# Patient Record
Sex: FEMALE | Race: WHITE | NOT HISPANIC OR LATINO | Employment: FULL TIME | URBAN - METROPOLITAN AREA
[De-identification: names, ages, dates, MRNs, and addresses within clinical notes are randomized per-mention and may not be internally consistent; named-entity substitution may affect disease eponyms.]

---

## 2017-12-12 ENCOUNTER — DOCUMENTATION (OUTPATIENT)
Dept: NEUROSURGERY | Facility: CLINIC | Age: 52
End: 2017-12-12

## 2017-12-12 NOTE — PROGRESS NOTES
Pt called wanting to know if it was safe to have an MRI since her procedure with  on 10/20/2015, I adv her that it was safe.  Karen Murcia CMA 12:15PM

## 2018-03-05 ENCOUNTER — HOSPITAL ENCOUNTER (OUTPATIENT)
Dept: CARDIOLOGY | Facility: HOSPITAL | Age: 53
Discharge: HOME | End: 2018-03-05
Attending: PLASTIC SURGERY | Admitting: PLASTIC SURGERY

## 2018-03-05 ENCOUNTER — TRANSCRIBE ORDERS (OUTPATIENT)
Dept: ADMINISTRATIVE | Facility: HOSPITAL | Age: 53
End: 2018-03-05

## 2018-03-05 DIAGNOSIS — D48.5 NEOPLASM OF UNCERTAIN BEHAVIOR OF SKIN: ICD-10-CM

## 2018-03-05 DIAGNOSIS — Z01.818 PRE-OP EXAM: Primary | ICD-10-CM

## 2018-03-05 DIAGNOSIS — Z01.818 PRE-OP EXAM: ICD-10-CM

## 2018-03-05 PROCEDURE — 93005 ELECTROCARDIOGRAM TRACING: CPT | Performed by: PLASTIC SURGERY

## 2018-03-05 PROCEDURE — 93010 ELECTROCARDIOGRAM REPORT: CPT | Performed by: INTERNAL MEDICINE

## 2018-03-14 PROCEDURE — 88304 TISSUE EXAM BY PATHOLOGIST: CPT | Performed by: PLASTIC SURGERY

## 2018-03-15 ENCOUNTER — LAB REQUISITION (OUTPATIENT)
Dept: LAB | Facility: HOSPITAL | Age: 53
End: 2018-03-15

## 2018-03-15 DIAGNOSIS — Z00.00 ENCOUNTER FOR GENERAL ADULT MEDICAL EXAMINATION WITHOUT ABNORMAL FINDINGS: ICD-10-CM

## 2018-03-16 LAB
CYTO UR: NORMAL
LAB AP CASE REPORT: NORMAL
LAB AP CLINICAL INFORMATION: NORMAL
Lab: NORMAL
PATH REPORT.FINAL DX SPEC: NORMAL
PATH REPORT.GROSS SPEC: NORMAL

## 2018-06-21 ENCOUNTER — HOSPITAL ENCOUNTER (OUTPATIENT)
Dept: GENERAL RADIOLOGY | Facility: HOSPITAL | Age: 53
Discharge: HOME | End: 2018-06-21
Attending: FAMILY MEDICINE | Admitting: FAMILY MEDICINE

## 2019-06-07 ENCOUNTER — OFFICE VISIT (OUTPATIENT)
Dept: NEUROLOGY | Facility: CLINIC | Age: 54
End: 2019-06-07

## 2019-06-07 VITALS
HEART RATE: 92 BPM | BODY MASS INDEX: 21.48 KG/M2 | HEIGHT: 69 IN | DIASTOLIC BLOOD PRESSURE: 70 MMHG | WEIGHT: 145 LBS | SYSTOLIC BLOOD PRESSURE: 115 MMHG | OXYGEN SATURATION: 99 %

## 2019-06-07 DIAGNOSIS — G25.81 RESTLESS LEGS SYNDROME (RLS): ICD-10-CM

## 2019-06-07 DIAGNOSIS — R55 SYNCOPE, CARDIOGENIC: Primary | ICD-10-CM

## 2019-06-07 DIAGNOSIS — I77.0 A-V FISTULA (HCC): ICD-10-CM

## 2019-06-07 DIAGNOSIS — G43.909 MIGRAINE WITHOUT STATUS MIGRAINOSUS, NOT INTRACTABLE, UNSPECIFIED MIGRAINE TYPE: ICD-10-CM

## 2019-06-07 DIAGNOSIS — R42 DIZZINESS: ICD-10-CM

## 2019-06-07 PROCEDURE — 99244 OFF/OP CNSLTJ NEW/EST MOD 40: CPT | Performed by: PSYCHIATRY & NEUROLOGY

## 2019-06-07 RX ORDER — DEXMETHYLPHENIDATE HYDROCHLORIDE 10 MG/1
10 TABLET ORAL DAILY PRN
COMMUNITY
End: 2019-12-17 | Stop reason: SDUPTHER

## 2019-06-07 NOTE — PROGRESS NOTES
Subjective:     Patient ID: Joanie Higuera is a 53 y.o. female.    History of Present Illness    The patient is a 53-year-old right-handed woman who was referred to our office for evaluation of dizziness.  The patient was seen today in consultation per the request of Dr. Bragg.  Patient also has chief complaints of:  Tinnitus  Near-syncope  Shortness of breath  Ataxia  Hip pain on the right  Numbness in the right foot and thigh  Decreased   Foggy thoughts  Shoulder pain  Dyslexic writing  Visual obscurations  Patient states she is she has had problems for about a year but it appears to be more than this she has an AV malformation in her heart that she states was operated on with coiling by a neurosurgeon done in [facility].  She is seen several cardiologist as well and has been diagnosed in the past as having cardiogenic syncope.  She has had a lipoma removed by a hand surgeon and states that she may be having neck pain related to this.  She has not talked to the surgeon about this.  She currently sees a cardiologist in [facility].  She has not had any recent neurodiagnostic studies.  She has had a pacemaker in the past but this was removed secondary to a staph infection.  This was apparently done at [facility].  Her Korlym procedure was done in [place].  Some of the records are in the chart and others or not.  I asked whether she could have an MRI and she states that she was told that she could but has records concerning this.  The patient is also on Topamax, Wellbutrin, and Focalin.      The following portions of the patient's history were reviewed and updated as appropriate: allergies, current medications, past family history, past medical history, past social history, past surgical history and problem list.      Family History   Problem Relation Age of Onset   • Arrhythmia Mother    • Cancer Mother    • Stroke Father    • Diabetes Father    • Parkinsonism Father    • Seizures Father         Past Medical History:   Diagnosis Date   • A-V fistula (CMS/HCC)    • Arthritis    • Bursitis of hip     LEFT   • Depression    • Hypertension    • Ischemic colitis (CMS/HCC)     HX   • Migraines    • MRSA infection 2011    RESULTING IN PPM EXPLANT   • PONV (postoperative nausea and vomiting)    • Restless leg syndrome    • Spinal headache    • Syncope, cardiogenic 1991    PM INSERTED   • Thyroid nodule     HX SURGERY       Social History     Socioeconomic History   • Marital status: Single     Spouse name: Not on file   • Number of children: Not on file   • Years of education: Not on file   • Highest education level: Not on file   Tobacco Use   • Smoking status: Never Smoker   • Smokeless tobacco: Never Used   Substance and Sexual Activity   • Alcohol use: No   • Drug use: No   • Sexual activity: Defer         Current Outpatient Medications:   •  buPROPion XL (WELLBUTRIN XL) 300 MG 24 hr tablet, Take 300 mg by mouth every night., Disp: , Rfl:   •  cetirizine (ZyrTEC) 10 MG tablet, Take 10 mg by mouth at night as needed for allergies., Disp: , Rfl:   •  dexmethylphenidate (FOCALIN) 10 MG tablet, Take 10 mg by mouth Daily As Needed., Disp: , Rfl:   •  Magnesium Oxide 400 MG capsule, Take 400 mg by mouth every night., Disp: , Rfl:   •  meloxicam (MOBIC) 7.5 MG tablet, Take 7.5 mg by mouth every night., Disp: , Rfl:   •  Multiple Vitamins-Minerals (MULTIVITAMIN ADULT PO), Take 1 tablet by mouth every morning., Disp: , Rfl:   •  pramipexole (MIRAPEX) 0.125 MG tablet, Take 0.25 mg by mouth every night., Disp: , Rfl:   •  topiramate (TOPAMAX) 50 MG tablet, Take 50 mg by mouth every night., Disp: , Rfl:     Review of Systems   Constitutional: Positive for fatigue. Negative for activity change, appetite change, chills, diaphoresis, fever and unexpected weight change.   HENT: Negative for congestion, dental problem, drooling, ear discharge, ear pain, facial swelling, hearing loss, mouth sores, nosebleeds, postnasal  drip, rhinorrhea, sinus pressure, sinus pain, sneezing, sore throat, tinnitus, trouble swallowing and voice change.    Eyes: Positive for photophobia and visual disturbance. Negative for pain, discharge, redness and itching.   Respiratory: Positive for shortness of breath. Negative for apnea, cough, choking, chest tightness, wheezing and stridor.    Cardiovascular: Positive for palpitations. Negative for chest pain and leg swelling.   Gastrointestinal: Negative.    Endocrine: Negative.    Musculoskeletal: Positive for arthralgias, gait problem, neck pain and neck stiffness. Negative for back pain, joint swelling and myalgias.   Skin: Negative.    Allergic/Immunologic: Negative.    Neurological: Positive for dizziness, weakness, light-headedness, numbness and headaches. Negative for tremors, seizures, syncope, facial asymmetry and speech difficulty.   Hematological: Negative.    Psychiatric/Behavioral: Positive for confusion. Negative for agitation, behavioral problems, decreased concentration, dysphoric mood, hallucinations, self-injury, sleep disturbance and suicidal ideas. The patient is not nervous/anxious and is not hyperactive.         I have reviewed ROS completed by medical assistant.     Objective:    Neurologic Exam  General appearance is normal. Mental status showed normal orientation, memory, attention span and concentration, language, and fund of knowledge for age.    Cranial nerve exam- visual fields to OKN tape, funduscopy with examination of the optic disc and posterior segments of the eye, eye movements, pupillary reflexes, muscles of mastication, facial musculature, hearing, palatal movement, shoulder shrug and tongue protrusion were all normal.    Sensory examination was normal.  Deep tendon reflexes were 2+ and symmetric.    No pathologic reflexes were noted.  Cerebellar function was normal.  No focal weakness was noted.  No drift of outstretched arms.  Tone was normal.  Gait and station were  normal.  No edema was noted.      Physical Exam    Assessment/Plan:     Joanie was seen today for dizziness.    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  She has a primary problem she was referred for for dizziness.  However she had a about another does not chief complaints.  It is difficult to swim through the complaints and make a connecting neurological diagnosis.  A lot of her symptoms seem to be related to syncope for which she has had a multitude of opinions and evaluation previously.  Exclude neurological issues I feel an MRI of the brain would be the next step.  I need to have assurance that this can be done in view of her past procedures.  I f asked that she get a note from her neurosurgeon concerning this.  For her neurologic diagnosis is unclear.  She will call me after she gets the note okaying the MRI from her neurosurgeon.  We would plan to set up an MRI at that point.  I have not set up specific follow-up in the office at this point.  Thank you for allowing me to share in the care of this patient.  Bairon Hernandez M.D.

## 2019-12-17 ENCOUNTER — TELEPHONE (OUTPATIENT)
Dept: FAMILY MEDICINE CLINIC | Facility: CLINIC | Age: 54
End: 2019-12-17

## 2019-12-17 ENCOUNTER — HOSPITAL ENCOUNTER (OUTPATIENT)
Dept: GENERAL RADIOLOGY | Facility: HOSPITAL | Age: 54
Discharge: HOME | End: 2019-12-17
Admitting: FAMILY MEDICINE

## 2019-12-17 ENCOUNTER — TELEPHONE (OUTPATIENT)
Dept: NEUROSURGERY | Facility: CLINIC | Age: 54
End: 2019-12-17

## 2019-12-17 ENCOUNTER — OFFICE VISIT (OUTPATIENT)
Dept: FAMILY MEDICINE CLINIC | Facility: CLINIC | Age: 54
End: 2019-12-17

## 2019-12-17 VITALS
DIASTOLIC BLOOD PRESSURE: 60 MMHG | WEIGHT: 164.2 LBS | RESPIRATION RATE: 16 BRPM | HEART RATE: 115 BPM | HEIGHT: 69 IN | TEMPERATURE: 97.7 F | SYSTOLIC BLOOD PRESSURE: 102 MMHG | OXYGEN SATURATION: 93 % | BODY MASS INDEX: 24.32 KG/M2

## 2019-12-17 DIAGNOSIS — G43.909 MIGRAINE WITHOUT STATUS MIGRAINOSUS, NOT INTRACTABLE, UNSPECIFIED MIGRAINE TYPE: ICD-10-CM

## 2019-12-17 DIAGNOSIS — I77.0 A-V FISTULA (HCC): ICD-10-CM

## 2019-12-17 DIAGNOSIS — R53.83 MALAISE AND FATIGUE: ICD-10-CM

## 2019-12-17 DIAGNOSIS — R53.81 MALAISE AND FATIGUE: ICD-10-CM

## 2019-12-17 DIAGNOSIS — E55.9 VITAMIN D DEFICIENCY: ICD-10-CM

## 2019-12-17 DIAGNOSIS — E66.3 OVERWEIGHT: ICD-10-CM

## 2019-12-17 DIAGNOSIS — F43.0 ACUTE REACTION TO STRESS: Primary | ICD-10-CM

## 2019-12-17 DIAGNOSIS — R05.9 COUGH: ICD-10-CM

## 2019-12-17 DIAGNOSIS — F43.23 ACUTE ADJUSTMENT DISORDER WITH MIXED ANXIETY AND DEPRESSED MOOD: ICD-10-CM

## 2019-12-17 PROCEDURE — 71046 X-RAY EXAM CHEST 2 VIEWS: CPT

## 2019-12-17 PROCEDURE — 99214 OFFICE O/P EST MOD 30 MIN: CPT | Performed by: FAMILY MEDICINE

## 2019-12-17 RX ORDER — SERTRALINE HYDROCHLORIDE 25 MG/1
25 TABLET, FILM COATED ORAL NIGHTLY
Qty: 30 TABLET | Refills: 5 | Status: SHIPPED | OUTPATIENT
Start: 2019-12-17 | End: 2020-04-27 | Stop reason: SDUPTHER

## 2019-12-17 RX ORDER — MELOXICAM 15 MG/1
TABLET ORAL
Refills: 2 | COMMUNITY
Start: 2019-12-16 | End: 2020-05-04

## 2019-12-17 RX ORDER — DEXMETHYLPHENIDATE HYDROCHLORIDE 10 MG/1
10 TABLET ORAL DAILY
Qty: 30 TABLET | Refills: 0 | Status: SHIPPED | OUTPATIENT
Start: 2019-12-17 | End: 2020-01-20 | Stop reason: SDUPTHER

## 2019-12-17 NOTE — TELEPHONE ENCOUNTER
wanted to have her get an mri of the brain without contrast. He had me call  who put in a vascular coil in the patient. I spoke to kinsey gloria MA and she said that she can have a a mri with this in. I have ordered that and after a pre cert it will be scheduled.

## 2019-12-17 NOTE — TELEPHONE ENCOUNTER
----- Message from Eren Brock MD sent at 12/17/2019  5:43 PM EST -----  Chest x-ray looks good, everything normal, thanks

## 2019-12-17 NOTE — TELEPHONE ENCOUNTER
Provider:  Comfort  Caller: Marianne  Time of call:   2:23  Phone #:  [phone]  Surgery:  10/20/15  Surgery Date:  Stent *  Last visit:   12/0415 radiology  Next visit: None    FABIÁN:         Reason for call:     Marianne called from Dr. Brock's office and wants to know if patient can have a MRI of the brain without contrast.  (Patient had a stent in 2015)

## 2019-12-18 LAB
25(OH)D3+25(OH)D2 SERPL-MCNC: 25.1 NG/ML (ref 30–100)
ALBUMIN SERPL-MCNC: 4.6 G/DL (ref 3.5–5.5)
ALBUMIN/GLOB SERPL: 2.2 {RATIO} (ref 1.2–2.2)
ALP SERPL-CCNC: 63 IU/L (ref 39–117)
ALT SERPL-CCNC: 13 IU/L (ref 0–32)
AST SERPL-CCNC: 16 IU/L (ref 0–40)
BASOPHILS # BLD AUTO: 0 X10E3/UL (ref 0–0.2)
BASOPHILS NFR BLD AUTO: 0 %
BILIRUB SERPL-MCNC: 0.2 MG/DL (ref 0–1.2)
BUN SERPL-MCNC: 20 MG/DL (ref 6–24)
BUN/CREAT SERPL: 18 (ref 9–23)
CALCIUM SERPL-MCNC: 10.3 MG/DL (ref 8.7–10.2)
CHLORIDE SERPL-SCNC: 105 MMOL/L (ref 96–106)
CO2 SERPL-SCNC: 25 MMOL/L (ref 20–29)
CREAT SERPL-MCNC: 1.1 MG/DL (ref 0.57–1)
EBV EA IGG SER-ACNC: 32.5 U/ML (ref 0–8.9)
EBV NA IGG SER IA-ACNC: 461 U/ML (ref 0–17.9)
EBV VCA IGG SER IA-ACNC: 111 U/ML (ref 0–17.9)
EBV VCA IGM SER IA-ACNC: <36 U/ML (ref 0–35.9)
EOSINOPHIL # BLD AUTO: 0.1 X10E3/UL (ref 0–0.4)
EOSINOPHIL NFR BLD AUTO: 1 %
ERYTHROCYTE [DISTWIDTH] IN BLOOD BY AUTOMATED COUNT: 13.7 % (ref 12.3–15.4)
FOLATE SERPL-MCNC: 12.2 NG/ML
GLOBULIN SER CALC-MCNC: 2.1 G/DL (ref 1.5–4.5)
GLUCOSE SERPL-MCNC: 90 MG/DL (ref 65–99)
HCT VFR BLD AUTO: 40.5 % (ref 34–46.6)
HGB BLD-MCNC: 13.4 G/DL (ref 11.1–15.9)
IMM GRANULOCYTES # BLD AUTO: 0 X10E3/UL (ref 0–0.1)
IMM GRANULOCYTES NFR BLD AUTO: 0 %
LYMPHOCYTES # BLD AUTO: 1.6 X10E3/UL (ref 0.7–3.1)
LYMPHOCYTES NFR BLD AUTO: 33 %
MCH RBC QN AUTO: 29.1 PG (ref 26.6–33)
MCHC RBC AUTO-ENTMCNC: 33.1 G/DL (ref 31.5–35.7)
MCV RBC AUTO: 88 FL (ref 79–97)
MONOCYTES # BLD AUTO: 0.4 X10E3/UL (ref 0.1–0.9)
MONOCYTES NFR BLD AUTO: 7 %
NEUTROPHILS # BLD AUTO: 2.8 X10E3/UL (ref 1.4–7)
NEUTROPHILS NFR BLD AUTO: 59 %
PLATELET # BLD AUTO: 339 X10E3/UL (ref 150–450)
POTASSIUM SERPL-SCNC: 5 MMOL/L (ref 3.5–5.2)
PROT SERPL-MCNC: 6.7 G/DL (ref 6–8.5)
RBC # BLD AUTO: 4.6 X10E6/UL (ref 3.77–5.28)
SERVICE CMNT-IMP: ABNORMAL
SODIUM SERPL-SCNC: 143 MMOL/L (ref 134–144)
T4 FREE SERPL-MCNC: 1.18 NG/DL (ref 0.82–1.77)
TSH SERPL DL<=0.005 MIU/L-ACNC: 1.53 UIU/ML (ref 0.45–4.5)
VIT B12 SERPL-MCNC: 391 PG/ML (ref 232–1245)
WBC # BLD AUTO: 4.9 X10E3/UL (ref 3.4–10.8)

## 2019-12-26 DIAGNOSIS — G43.909 MIGRAINE WITHOUT STATUS MIGRAINOSUS, NOT INTRACTABLE, UNSPECIFIED MIGRAINE TYPE: ICD-10-CM

## 2019-12-26 NOTE — PROGRESS NOTES
Your MRI of your brain looks good, everything normal, it did show some chronic inflammation/sinusitis in your sinuses.

## 2019-12-27 ENCOUNTER — TELEPHONE (OUTPATIENT)
Dept: FAMILY MEDICINE CLINIC | Facility: CLINIC | Age: 54
End: 2019-12-27

## 2019-12-27 RX ORDER — ERGOCALCIFEROL 1.25 MG/1
50000 CAPSULE ORAL WEEKLY
Qty: 6 CAPSULE | Refills: 0 | Status: SHIPPED | OUTPATIENT
Start: 2019-12-27 | End: 2021-12-22 | Stop reason: SDUPTHER

## 2019-12-27 NOTE — TELEPHONE ENCOUNTER
----- Message from Eren Brock MD sent at 12/26/2019 12:22 PM EST -----  Your blood work overall looks good, blood count and thyroid are normal, your kidney function is just mildly low, this could be just from some dehydration, will recheck this again in the future, your vitamin D levels are low, this could cause fatigue, I will send a high-dose vitamin D tablet for you to take weekly for the next 6 weeks, after that you should take 2000 units of vitamin D over-the-counter daily, your blood work shows past exposure to mono but no recent infection, thanks    Send me a request for high-dose vitamin D3 50,000 units weekly for 6 weeks, thanks

## 2020-01-13 ENCOUNTER — TELEPHONE (OUTPATIENT)
Dept: FAMILY MEDICINE CLINIC | Facility: CLINIC | Age: 55
End: 2020-01-13

## 2020-01-13 DIAGNOSIS — M54.2 NECK PAIN: Primary | ICD-10-CM

## 2020-01-13 NOTE — TELEPHONE ENCOUNTER
Go ahead and get her set up for an x-ray of her C-spine, would recommend HEENT referral to evaluate the tinnitus could see Dr. Souza, the the emphysematous contour of her lungs may have just been if she was inhaling when the picture was taken and her lungs were expanded, lets go ahead and set her up for some pulmonary function tests to evaluate her lung function, thanks

## 2020-01-13 NOTE — TELEPHONE ENCOUNTER
----- Message from Joanie Higuera sent at 1/10/2020  4:10 PM EST -----  Regarding: RE:in response to numbness  Contact: [phone]  Ok.   Along with the numbness, he and I discussed ringing in the ears that has been going on for close to 2 years, and if the numbness and ringing could be related to something getting pinched or compressed. Hoping some imaging could shed some light on it.   Also, I had questions that I sent regarding the chest X-ray, what does emphysematous contouring mean?  Thank you!  ----- Message -----  From: ENDER HUNTER  Sent: 1/10/2020  1:57 PM EST  To: Joanie Higuera  Subject: in response to numbness   said to Let you know that your MRI of your brain does rule out a lot of problems,  would be reasonable to consider some imaging of your neck to look into the numbness you are having further, if you are open to that he said we can do an x-ray of your neck first, we will call you with the results and then move onto the MRI of your neck if necessary. So let me know if you want that xray!  Thank you  Marianne galaviz

## 2020-01-16 DIAGNOSIS — F43.0 ACUTE REACTION TO STRESS: Primary | ICD-10-CM

## 2020-01-17 RX ORDER — LORAZEPAM 0.5 MG/1
TABLET ORAL
Qty: 15 TABLET | Refills: 0 | Status: SHIPPED | OUTPATIENT
Start: 2020-01-17 | End: 2020-02-18

## 2020-01-20 DIAGNOSIS — F43.23 ACUTE ADJUSTMENT DISORDER WITH MIXED ANXIETY AND DEPRESSED MOOD: ICD-10-CM

## 2020-01-20 RX ORDER — DEXMETHYLPHENIDATE HYDROCHLORIDE 10 MG/1
10 TABLET ORAL DAILY
Qty: 30 TABLET | Refills: 0 | Status: SHIPPED | OUTPATIENT
Start: 2020-01-20 | End: 2020-02-12 | Stop reason: SDUPTHER

## 2020-02-12 DIAGNOSIS — F43.0 ACUTE REACTION TO STRESS: ICD-10-CM

## 2020-02-12 DIAGNOSIS — F43.23 ACUTE ADJUSTMENT DISORDER WITH MIXED ANXIETY AND DEPRESSED MOOD: ICD-10-CM

## 2020-02-19 RX ORDER — LORAZEPAM 0.5 MG/1
TABLET ORAL
Qty: 15 TABLET | Refills: 1 | Status: SHIPPED | OUTPATIENT
Start: 2020-02-19 | End: 2020-04-27 | Stop reason: SDUPTHER

## 2020-02-19 RX ORDER — DEXMETHYLPHENIDATE HYDROCHLORIDE 10 MG/1
10 TABLET ORAL DAILY
Qty: 30 TABLET | Refills: 0 | Status: SHIPPED | OUTPATIENT
Start: 2020-02-19 | End: 2020-04-27 | Stop reason: SDUPTHER

## 2020-03-23 ENCOUNTER — E-VISIT (OUTPATIENT)
Dept: FAMILY MEDICINE CLINIC | Facility: TELEHEALTH | Age: 55
End: 2020-03-23

## 2020-03-24 NOTE — PROGRESS NOTES
I counseled the patient to follow up with UC     With fever, cough and soa as well as comorbid conditions, recommend screening at Urgent Care.  .

## 2020-04-06 ENCOUNTER — OFFICE VISIT (OUTPATIENT)
Dept: FAMILY MEDICINE CLINIC | Facility: CLINIC | Age: 55
End: 2020-04-06

## 2020-04-06 VITALS
HEIGHT: 69 IN | HEART RATE: 101 BPM | OXYGEN SATURATION: 100 % | SYSTOLIC BLOOD PRESSURE: 110 MMHG | RESPIRATION RATE: 16 BRPM | DIASTOLIC BLOOD PRESSURE: 62 MMHG | WEIGHT: 159.6 LBS | BODY MASS INDEX: 23.64 KG/M2 | TEMPERATURE: 98 F

## 2020-04-06 DIAGNOSIS — F43.0 ACUTE REACTION TO STRESS: ICD-10-CM

## 2020-04-06 DIAGNOSIS — M54.2 NECK PAIN: ICD-10-CM

## 2020-04-06 DIAGNOSIS — E66.3 OVERWEIGHT: ICD-10-CM

## 2020-04-06 DIAGNOSIS — I95.9 HYPOTENSION, UNSPECIFIED HYPOTENSION TYPE: ICD-10-CM

## 2020-04-06 DIAGNOSIS — G43.909 MIGRAINE WITHOUT STATUS MIGRAINOSUS, NOT INTRACTABLE, UNSPECIFIED MIGRAINE TYPE: Primary | ICD-10-CM

## 2020-04-06 DIAGNOSIS — I77.0 A-V FISTULA (HCC): ICD-10-CM

## 2020-04-06 DIAGNOSIS — G47.26 SHIFT WORK SLEEP DISORDER: ICD-10-CM

## 2020-04-06 PROCEDURE — 99214 OFFICE O/P EST MOD 30 MIN: CPT | Performed by: FAMILY MEDICINE

## 2020-04-06 RX ORDER — AMOXICILLIN 500 MG/1
CAPSULE ORAL
COMMUNITY
Start: 2020-03-10 | End: 2020-04-06

## 2020-04-06 NOTE — PROGRESS NOTES
Subjective   Joanie Higuera is a 54 y.o. female.     Chief Complaint   Patient presents with   • Migraine   • Cough       Migraine    This is a recurrent problem. The current episode started more than 1 year ago. The problem occurs constantly. The problem has been gradually worsening. The pain is located in the temporal region. The pain does not radiate. The pain quality is not similar to prior headaches. The quality of the pain is described as aching and dull. Associated symptoms include coughing and weakness. Pertinent negatives include no abdominal pain, nausea or seizures.            I personally reviewed and updated the patient's allergies, medications, problem list, and past medical, surgical, social, and family history.     Family History   Problem Relation Age of Onset   • Arrhythmia Mother    • Cancer Mother    • Stroke Father    • Diabetes Father    • Parkinsonism Father    • Seizures Father        Social History     Tobacco Use   • Smoking status: Never Smoker   • Smokeless tobacco: Never Used   Substance Use Topics   • Alcohol use: No   • Drug use: No       Past Surgical History:   Procedure Laterality Date   • ARTERIOVENOUS FISTULA REPAIR  2015    ENDO REPAIR BY DR KAPLAN   • CARDIAC CATHETERIZATION     • CARDIAC CATHETERIZATION  08/05/2016    RIGHT HEART CATH PER DR. 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 fistula (CMS/HCC)   • Bursitis of hip   • PONV (postoperative nausea and vomiting)   • Ischemic colitis (CMS/HCC)   • Syncope, cardiogenic   • MRSA infection   • Thyroid nodule   • Arthritis    • Dizziness   • Migraine without status migrainosus, not intractable   • Restless legs syndrome (RLS)   • Menopause   • Pacemaker infection (CMS/HCC)   • Palpitations   • Acute reaction to stress   • Tachycardia   • Allergic rhinitis   • Asthma   • GERD (gastroesophageal reflux disease)   • Heart murmur   • Hyperlipemia   • Hypotension   • Overweight   • RLS (restless legs syndrome)   • Acute adjustment disorder with mixed anxiety and depressed mood   • Shift work sleep disorder         Current Outpatient Medications:   •  buPROPion XL (WELLBUTRIN XL) 300 MG 24 hr tablet, Take 300 mg by mouth every night., Disp: , Rfl:   •  cetirizine (ZyrTEC) 10 MG tablet, Take 10 mg by mouth at night as needed for allergies., Disp: , Rfl:   •  dexmethylphenidate (FOCALIN) 10 MG tablet, Take 1 tablet by mouth Daily., Disp: 30 tablet, Rfl: 0  •  LORazepam (ATIVAN) 0.5 MG tablet, TAKE 1 TABLET BY MOUTH EVERY 2 TO 4 HOURS AS NEEDED DURING TRAVEL, Disp: 15 tablet, Rfl: 1  •  Magnesium Oxide 400 MG capsule, Take 400 mg by mouth every night., Disp: , Rfl:   •  meloxicam (MOBIC) 15 MG tablet, , Disp: , Rfl: 2  •  Multiple Vitamins-Minerals (MULTIVITAMIN ADULT PO), Take 1 tablet by mouth every morning., Disp: , Rfl:   •  pramipexole (MIRAPEX) 0.125 MG tablet, Take 0.25 mg by mouth every night., Disp: , Rfl:   •  sertraline (ZOLOFT) 25 MG tablet, Take 1 tablet by mouth Every Night., Disp: 30 tablet, Rfl: 5  •  topiramate (TOPAMAX) 50 MG tablet, Take 50 mg by mouth every night., Disp: , Rfl:   •  vitamin D (ERGOCALCIFEROL) 1.25 MG (08455 UT) capsule capsule, Take 1 capsule by mouth 1 (One) Time Per Week., Disp: 6 capsule, Rfl: 0         Review of Systems   Constitutional: Negative for chills and diaphoresis.   Eyes: Negative for visual disturbance.   Respiratory: Positive for cough. Negative for shortness of breath.    Cardiovascular: Negative for chest pain and palpitations.   Gastrointestinal: Negative for abdominal pain and nausea.  "  Endocrine: Negative for polydipsia and polyphagia.   Musculoskeletal: Negative for neck stiffness.   Skin: Negative for color change and pallor.   Neurological: Positive for weakness. Negative for seizures and syncope.   Hematological: Negative for adenopathy.   Psychiatric/Behavioral: Negative for hallucinations and suicidal ideas.       I have reviewed and confirmed the accuracy of the ROS as documented by the MA/LPN/RN Eren Brock MD      Objective   /62   Pulse 101   Temp 98 °F (36.7 °C)   Resp 16   Ht 175.3 cm (69\")   Wt 72.4 kg (159 lb 9.6 oz)   SpO2 100%   Breastfeeding No   BMI 23.57 kg/m²   Wt Readings from Last 3 Encounters:   04/06/20 72.4 kg (159 lb 9.6 oz)   12/17/19 74.5 kg (164 lb 3.2 oz)   06/07/19 65.8 kg (145 lb)     Physical Exam   Constitutional: She is oriented to person, place, and time. She appears well-developed and well-nourished.   Cardiovascular: Normal rate, regular rhythm, S1 normal, S2 normal, normal heart sounds, intact distal pulses and normal pulses. Exam reveals no gallop and no friction rub.   No murmur heard.  Pulmonary/Chest: Effort normal and breath sounds normal. No accessory muscle usage or stridor. She has no decreased breath sounds. She has no wheezes. She has no rhonchi. She has no rales.   Abdominal: Soft. Normal appearance, normal aorta and bowel sounds are normal. She exhibits no distension, no pulsatile midline mass and no mass. There is no hepatosplenomegaly. There is no tenderness. There is no rigidity, no rebound, no guarding, no CVA tenderness and negative Wild's sign. No hernia.   Neurological: She is alert and oriented to person, place, and time. She has normal strength and normal reflexes. She displays no tremor. No cranial nerve deficit or sensory deficit. She exhibits normal muscle tone. She displays no seizure activity. Coordination and gait normal.   Skin: Skin is warm and dry. Turgor is normal. She is not diaphoretic. No pallor. "         Assessment/Plan       Anxiety.    Improved on increases dose zoloft.  On Wellbutrin.  Consider counseling.  Good social support.  Follow-up recheck  Shift work disorder.  Start nuvigil, continue melatonin  Vitamin D deficiency.  Replace, follow-up recheck.  Lightheadedness/orthostasis.  h/o hypotension, bradycardia, followed by cardiology / [place], recommend heent eval she declines currently, has had vu in past.  Has had neurology eval per Dr. Hernandez, MRI brain normal 12/19  AV fistula.  Clinically improved status post embolization [facility].  Recheck echo.  Family history of colon cancer.  Recommend repeat colonoscopy.  Malaise and fatigue.  possibly 2nd hypotension, rec fludricortisone, midrodrine, she declines 2nd side effects (has had difficulty tolerating this in past, follwed by cardiology)recommend sleep study, + fh sumaya (father)  she exercises twice daily  Bradycardia.  Pacemaker out currently/history of pacemaker infection.  Restless leg syndrome.  Improved on Mirapex.  Neck pain.  recommend x-ray/ MRI neck / rescgeduled  Tinnitus.  HEENT referral scheduled  Emphysematous changes in chest x-ray.  Check pulmonary function testing.  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  Call or return if worsening or persistent symptoms.

## 2020-04-11 PROBLEM — G47.26 SHIFT WORK SLEEP DISORDER: Status: ACTIVE | Noted: 2020-04-11

## 2020-04-27 DIAGNOSIS — F43.23 ACUTE ADJUSTMENT DISORDER WITH MIXED ANXIETY AND DEPRESSED MOOD: ICD-10-CM

## 2020-04-27 DIAGNOSIS — F43.0 ACUTE REACTION TO STRESS: ICD-10-CM

## 2020-04-27 RX ORDER — SERTRALINE HYDROCHLORIDE 25 MG/1
25 TABLET, FILM COATED ORAL NIGHTLY
Qty: 30 TABLET | Refills: 5 | Status: SHIPPED | OUTPATIENT
Start: 2020-04-27 | End: 2020-07-09 | Stop reason: SDUPTHER

## 2020-04-27 RX ORDER — LORAZEPAM 0.5 MG/1
0.5 TABLET ORAL EVERY 6 HOURS PRN
Qty: 15 TABLET | Refills: 1 | Status: SHIPPED | OUTPATIENT
Start: 2020-04-27 | End: 2021-05-28 | Stop reason: SDUPTHER

## 2020-04-27 RX ORDER — DEXMETHYLPHENIDATE HYDROCHLORIDE 10 MG/1
10 TABLET ORAL DAILY
Qty: 30 TABLET | Refills: 0 | Status: SHIPPED | OUTPATIENT
Start: 2020-04-27 | End: 2020-06-08 | Stop reason: SDUPTHER

## 2020-04-30 DIAGNOSIS — M54.2 NECK PAIN: ICD-10-CM

## 2020-05-04 ENCOUNTER — TELEPHONE (OUTPATIENT)
Dept: FAMILY MEDICINE CLINIC | Facility: CLINIC | Age: 55
End: 2020-05-04

## 2020-05-04 RX ORDER — BUPROPION HYDROCHLORIDE 300 MG/1
TABLET ORAL
Qty: 90 TABLET | Refills: 0 | Status: SHIPPED | OUTPATIENT
Start: 2020-05-04 | End: 2020-08-12

## 2020-05-04 RX ORDER — PRAMIPEXOLE DIHYDROCHLORIDE 0.12 MG/1
TABLET ORAL
Qty: 180 TABLET | Refills: 0 | Status: SHIPPED | OUTPATIENT
Start: 2020-05-04 | End: 2020-08-12

## 2020-05-04 RX ORDER — TOPIRAMATE 50 MG/1
TABLET, FILM COATED ORAL
Qty: 90 TABLET | Refills: 0 | Status: SHIPPED | OUTPATIENT
Start: 2020-05-04 | End: 2020-08-12

## 2020-05-04 RX ORDER — MELOXICAM 15 MG/1
TABLET ORAL
Qty: 90 TABLET | Refills: 0 | Status: SHIPPED | OUTPATIENT
Start: 2020-05-04 | End: 2020-08-12

## 2020-05-04 NOTE — TELEPHONE ENCOUNTER
----- Message from Eren Brock MD sent at 5/1/2020  4:35 PM EDT -----  Let her know her MRI shows that she has a ruptured disc at the C5/C6 level, this is pinching nerves and causing the symptoms in her arm, she also has bulging disks at other levels that are contributing to her symptoms, recommend that she be evaluated by neurosurgery, could see Dr. Cassidy at [facility] or Dr. Berkowitz at [facility], get her set up, thanks

## 2020-06-08 DIAGNOSIS — F43.23 ACUTE ADJUSTMENT DISORDER WITH MIXED ANXIETY AND DEPRESSED MOOD: ICD-10-CM

## 2020-06-08 RX ORDER — DEXMETHYLPHENIDATE HYDROCHLORIDE 10 MG/1
10 TABLET ORAL DAILY
Qty: 30 TABLET | Refills: 0 | Status: SHIPPED | OUTPATIENT
Start: 2020-06-08 | End: 2020-07-09 | Stop reason: SDUPTHER

## 2020-07-09 DIAGNOSIS — F43.23 ACUTE ADJUSTMENT DISORDER WITH MIXED ANXIETY AND DEPRESSED MOOD: ICD-10-CM

## 2020-07-09 DIAGNOSIS — F43.0 ACUTE REACTION TO STRESS: ICD-10-CM

## 2020-07-10 RX ORDER — DEXMETHYLPHENIDATE HYDROCHLORIDE 10 MG/1
10 TABLET ORAL DAILY
Qty: 30 TABLET | Refills: 0 | Status: SHIPPED | OUTPATIENT
Start: 2020-07-10 | End: 2021-05-24

## 2020-07-10 RX ORDER — SERTRALINE HYDROCHLORIDE 25 MG/1
25 TABLET, FILM COATED ORAL NIGHTLY
Qty: 30 TABLET | Refills: 5 | Status: SHIPPED | OUTPATIENT
Start: 2020-07-10 | End: 2020-11-21

## 2020-08-12 DIAGNOSIS — G25.81 RESTLESS LEGS SYNDROME (RLS): ICD-10-CM

## 2020-08-12 DIAGNOSIS — G43.809 OTHER MIGRAINE WITHOUT STATUS MIGRAINOSUS, NOT INTRACTABLE: ICD-10-CM

## 2020-08-12 DIAGNOSIS — F43.0 ACUTE REACTION TO STRESS: Primary | ICD-10-CM

## 2020-08-12 RX ORDER — TOPIRAMATE 50 MG/1
TABLET, FILM COATED ORAL
Qty: 90 TABLET | Refills: 0 | Status: SHIPPED | OUTPATIENT
Start: 2020-08-12 | End: 2020-11-21

## 2020-08-12 RX ORDER — PRAMIPEXOLE DIHYDROCHLORIDE 0.12 MG/1
TABLET ORAL
Qty: 180 TABLET | Refills: 0 | Status: SHIPPED | OUTPATIENT
Start: 2020-08-12 | End: 2020-11-21

## 2020-08-12 RX ORDER — MELOXICAM 15 MG/1
TABLET ORAL
Qty: 90 TABLET | Refills: 0 | Status: SHIPPED | OUTPATIENT
Start: 2020-08-12 | End: 2020-11-21

## 2020-08-12 RX ORDER — BUPROPION HYDROCHLORIDE 300 MG/1
TABLET ORAL
Qty: 90 TABLET | Refills: 0 | Status: SHIPPED | OUTPATIENT
Start: 2020-08-12 | End: 2020-11-21

## 2020-08-23 DIAGNOSIS — F43.23 ACUTE ADJUSTMENT DISORDER WITH MIXED ANXIETY AND DEPRESSED MOOD: ICD-10-CM

## 2020-08-25 RX ORDER — DEXMETHYLPHENIDATE HYDROCHLORIDE 10 MG/1
10 TABLET ORAL DAILY
Qty: 30 TABLET | Refills: 0 | OUTPATIENT
Start: 2020-08-25

## 2020-09-15 ENCOUNTER — TELEPHONE (OUTPATIENT)
Dept: NEUROSURGERY | Facility: CLINIC | Age: 55
End: 2020-09-15

## 2020-09-15 NOTE — TELEPHONE ENCOUNTER
A female with [facility] neurology called [facility] stating the patient had a coil placed with Dr. Moyer back in 12/2015.   Stated the patient is scheduled for an MRI, and they need to know the information regarding the Coil, before they can proceed with the MRI scheduling. Would like someone to return their call @ [phone]

## 2020-11-18 DIAGNOSIS — G43.809 OTHER MIGRAINE WITHOUT STATUS MIGRAINOSUS, NOT INTRACTABLE: ICD-10-CM

## 2020-11-18 DIAGNOSIS — G25.81 RESTLESS LEGS SYNDROME (RLS): ICD-10-CM

## 2020-11-18 DIAGNOSIS — F43.0 ACUTE REACTION TO STRESS: ICD-10-CM

## 2020-11-19 DIAGNOSIS — F43.0 ACUTE REACTION TO STRESS: ICD-10-CM

## 2020-11-21 RX ORDER — MELOXICAM 15 MG/1
TABLET ORAL
Qty: 90 TABLET | Refills: 0 | Status: SHIPPED | OUTPATIENT
Start: 2020-11-21 | End: 2021-02-22

## 2020-11-21 RX ORDER — BUPROPION HYDROCHLORIDE 300 MG/1
TABLET ORAL
Qty: 90 TABLET | Refills: 0 | Status: SHIPPED | OUTPATIENT
Start: 2020-11-21 | End: 2021-02-22

## 2020-11-21 RX ORDER — SERTRALINE HYDROCHLORIDE 25 MG/1
25 TABLET, FILM COATED ORAL NIGHTLY
Qty: 30 TABLET | Refills: 5 | Status: SHIPPED | OUTPATIENT
Start: 2020-11-21 | End: 2021-05-24

## 2020-11-21 RX ORDER — TOPIRAMATE 50 MG/1
TABLET, FILM COATED ORAL
Qty: 90 TABLET | Refills: 0 | Status: SHIPPED | OUTPATIENT
Start: 2020-11-21 | End: 2021-02-22

## 2020-11-21 RX ORDER — PRAMIPEXOLE DIHYDROCHLORIDE 0.12 MG/1
TABLET ORAL
Qty: 180 TABLET | Refills: 0 | Status: SHIPPED | OUTPATIENT
Start: 2020-11-21 | End: 2021-02-22

## 2021-01-05 ENCOUNTER — TELEPHONE (OUTPATIENT)
Dept: FAMILY MEDICINE CLINIC | Facility: CLINIC | Age: 56
End: 2021-01-05

## 2021-01-05 NOTE — TELEPHONE ENCOUNTER
Spoke to patient and she is going to the ER at [facility]. She said she is just to short of breath and very fatigued.  She said he o2 is now dropping to 80 and below if she moves around at all.

## 2021-01-05 NOTE — TELEPHONE ENCOUNTER
Patient called stating she tested positive on 1/2/20. Her O2 has been in the range of 97-99 while sitting. Walking roughly 50 feet from one room to another, her O2 has been as low at 83. She stated her heart rate has been elevated while walking as well. She sounded slightly out of breath on the phone.

## 2021-02-03 ENCOUNTER — TELEPHONE (OUTPATIENT)
Dept: FAMILY MEDICINE CLINIC | Facility: CLINIC | Age: 56
End: 2021-02-03

## 2021-02-03 NOTE — TELEPHONE ENCOUNTER
----- Message from Joanie Higuera sent at 2/3/2021 11:15 AM EST -----  Regarding: Non-Urgent Medical Question  Contact: [phone]  Hi  I  was exposed to Covid Mee Bucke and tested positive January 2. I’ve experienced a variety of symptoms, but a month+ later I still have horrible headaches, fatigue and incredibly loud ringing in my ears. My smell and taste have not returned either.     Any suggestions for the headache/fatigue and ringing in the ears?    Also - do you need the test results from the state or are you able to access that information?

## 2021-02-08 ENCOUNTER — TELEPHONE (OUTPATIENT)
Dept: FAMILY MEDICINE CLINIC | Facility: CLINIC | Age: 56
End: 2021-02-08

## 2021-02-08 ENCOUNTER — OFFICE VISIT (OUTPATIENT)
Dept: FAMILY MEDICINE CLINIC | Facility: CLINIC | Age: 56
End: 2021-02-08

## 2021-02-08 VITALS
HEIGHT: 69 IN | WEIGHT: 173 LBS | DIASTOLIC BLOOD PRESSURE: 80 MMHG | BODY MASS INDEX: 25.62 KG/M2 | OXYGEN SATURATION: 95 % | HEART RATE: 106 BPM | SYSTOLIC BLOOD PRESSURE: 120 MMHG | RESPIRATION RATE: 20 BRPM | TEMPERATURE: 97.7 F

## 2021-02-08 DIAGNOSIS — G47.26 SHIFT WORK SLEEP DISORDER: ICD-10-CM

## 2021-02-08 DIAGNOSIS — J06.9 UPPER RESPIRATORY TRACT INFECTION, UNSPECIFIED TYPE: ICD-10-CM

## 2021-02-08 DIAGNOSIS — U07.1 COVID-19 VIRUS INFECTION: ICD-10-CM

## 2021-02-08 DIAGNOSIS — E04.1 THYROID NODULE: ICD-10-CM

## 2021-02-08 DIAGNOSIS — R53.83 FATIGUE, UNSPECIFIED TYPE: ICD-10-CM

## 2021-02-08 DIAGNOSIS — R06.02 SOB (SHORTNESS OF BREATH): Primary | ICD-10-CM

## 2021-02-08 PROBLEM — Z95.0 PACEMAKER: Status: ACTIVE | Noted: 2021-02-08

## 2021-02-08 PROCEDURE — 99213 OFFICE O/P EST LOW 20 MIN: CPT | Performed by: FAMILY MEDICINE

## 2021-02-08 RX ORDER — GABAPENTIN 300 MG/1
300 CAPSULE ORAL 3 TIMES DAILY
COMMUNITY
Start: 2021-01-29 | End: 2021-08-23 | Stop reason: SDUPTHER

## 2021-02-08 RX ORDER — CEPHALEXIN 500 MG/1
500 CAPSULE ORAL 3 TIMES DAILY
Qty: 30 CAPSULE | Refills: 0 | Status: SHIPPED | OUTPATIENT
Start: 2021-02-08 | End: 2021-05-24

## 2021-02-08 RX ORDER — PREDNISONE 1 MG/1
TABLET ORAL
Qty: 39 TABLET | Refills: 0 | Status: SHIPPED | OUTPATIENT
Start: 2021-02-08 | End: 2021-05-24

## 2021-02-08 RX ORDER — MODAFINIL 100 MG/1
100 TABLET ORAL DAILY
Qty: 30 TABLET | Refills: 2 | Status: SHIPPED | OUTPATIENT
Start: 2021-02-08 | End: 2021-04-07 | Stop reason: SDUPTHER

## 2021-02-08 NOTE — TELEPHONE ENCOUNTER
PATIENT HAD APPT TODAY WITH DR. SCHULTZ AND WENT TO THE PHARMACY TO  HER [facility] AND THEY SAID THEY NEED A PA. PATIENT IS WAITING AT PHARMACY AND WANTS TO KNOW IF THIS CAN BE TAKEN CARE OF ASAP. PLEASE ADVISE.     PATIENT CALL BACK: [phone]

## 2021-02-08 NOTE — PROGRESS NOTES
Subjective   Joanie Higuera is a 55 y.o. female.     Chief Complaint   Patient presents with   • URI   • Fatigue       URI   This is a recurrent problem. The current episode started more than 1 month ago. Associated symptoms include ear pain, headaches and nausea. Pertinent negatives include no abdominal pain or chest pain. She has tried acetaminophen for the symptoms. The treatment provided no relief.   Fatigue  This is a new problem. The current episode started more than 1 month ago. The problem occurs constantly. Associated symptoms include fatigue, headaches and nausea. Pertinent negatives include no abdominal pain, chest pain, chills or diaphoresis. Nothing aggravates the symptoms. She has tried acetaminophen, NSAIDs, lying down and rest for the symptoms. The treatment provided mild relief.            I personally reviewed and updated the patient's allergies, medications, problem list, and past medical, surgical, social, and family history. I have reviewed and confirmed the accuracy of the History of Present Illness and Review of Symptoms as documented by the MA/LPN/RN. Eren Brock MD    Family History   Problem Relation Age of Onset   • Arrhythmia Mother    • Cancer Mother    • Stroke Father    • Diabetes Father    • Parkinsonism Father    • Seizures Father        Social History     Tobacco Use   • Smoking status: Never Smoker   • Smokeless tobacco: Never Used   Substance Use Topics   • Alcohol use: No   • Drug use: No       Past Surgical History:   Procedure Laterality Date   • ARTERIOVENOUS FISTULA REPAIR  2015    ENDO REPAIR BY DR KAPLAN   • CARDIAC CATHETERIZATION     • CARDIAC CATHETERIZATION  08/05/2016    RIGHT HEART CATH PER DR. MATA   • CARDIAC CATHETERIZATION N/A 8/5/2016    Procedure: Right Heart Cath;  Surgeon: Holley Mata MD;  Location: [facility] CATH INVASIVE LOCATION;  Service:    • CARDIAC PACEMAKER REMOVAL  2012    FOR [facility] INFECTION   • FOOT SURGERY Right    •  HYSTERECTOMY      ovaries remain   • PACEMAKER IMPLANTATION  1991 - ORIGINAL   • THYROID LOBECTOMY Left        Patient Active Problem List   Diagnosis   • SOB (shortness of breath)   • A-V fistula (CMS/HCC)   • Bursitis of hip   • PONV (postoperative nausea and vomiting)   • Ischemic colitis (CMS/HCC)   • Syncope, cardiogenic   • MRSA infection   • Thyroid nodule   • Arthritis   • Dizziness   • Migraine without status migrainosus, not intractable   • Restless legs syndrome (RLS)   • Menopause   • Pacemaker infection (CMS/HCC)   • Palpitations   • Acute reaction to stress   • Tachycardia   • Allergic rhinitis   • Asthma   • GERD (gastroesophageal reflux disease)   • Heart murmur   • Hyperlipemia   • Hypotension   • Overweight   • RLS (restless legs syndrome)   • Acute adjustment disorder with mixed anxiety and depressed mood   • Shift work sleep disorder   • Pacemaker   • COVID-19 virus infection         Current Outpatient Medications:   •  buPROPion XL (WELLBUTRIN XL) 300 MG 24 hr tablet, TAKE 1 TABLET BY MOUTH EVERY DAY, Disp: 90 tablet, Rfl: 0  •  cetirizine (ZyrTEC) 10 MG tablet, Take 10 mg by mouth at night as needed for allergies., Disp: , Rfl:   •  dexmethylphenidate (Focalin) 10 MG tablet, Take 1 tablet by mouth Daily., Disp: 30 tablet, Rfl: 0  •  gabapentin (NEURONTIN) 300 MG capsule, Take 300 mg by mouth 3 (Three) Times a Day., Disp: , Rfl:   •  LORazepam (ATIVAN) 0.5 MG tablet, Take 1 tablet by mouth Every 6 (Six) Hours As Needed for Anxiety., Disp: 15 tablet, Rfl: 1  •  Magnesium Oxide 400 MG capsule, Take 400 mg by mouth every night., Disp: , Rfl:   •  meloxicam (MOBIC) 15 MG tablet, TAKE 1 TABLET BY MOUTH DAILY, Disp: 90 tablet, Rfl: 0  •  Multiple Vitamins-Minerals (MULTIVITAMIN ADULT PO), Take 1 tablet by mouth every morning., Disp: , Rfl:   •  pramipexole (MIRAPEX) 0.125 MG tablet, TAKE 1 TO 2 TABLETS BY MOUTH EVERY EVENING, Disp: 180 tablet, Rfl: 0  •  sertraline (ZOLOFT) 25 MG tablet, TAKE 1 TABLET  "BY MOUTH EVERY NIGHT, Disp: 30 tablet, Rfl: 5  •  topiramate (TOPAMAX) 50 MG tablet, TAKE 1 TABLET BY MOUTH EVERY NIGHT AT BEDTIME, Disp: 90 tablet, Rfl: 0  •  vitamin D (ERGOCALCIFEROL) 1.25 MG (10321 UT) capsule capsule, Take 1 capsule by mouth 1 (One) Time Per Week., Disp: 6 capsule, Rfl: 0  •  cephalexin (KEFLEX) 500 MG capsule, Take 1 capsule by mouth 3 (Three) Times a Day., Disp: 30 capsule, Rfl: 0  •  modafinil (Provigil) 100 MG tablet, Take 1 tablet by mouth Daily., Disp: 30 tablet, Rfl: 2  •  predniSONE (DELTASONE) 5 MG tablet, 30mg x 3 days, 20mg x 3 days, 10mg x 3 days, 5mg x 3 days, Disp: 39 tablet, Rfl: 0         Review of Systems   Constitutional: Positive for fatigue. Negative for chills and diaphoresis.   HENT: Positive for ear pain.    Eyes: Negative for visual disturbance.   Respiratory: Negative for shortness of breath.    Cardiovascular: Negative for chest pain and palpitations.   Gastrointestinal: Positive for nausea. Negative for abdominal pain.   Endocrine: Negative for polydipsia and polyphagia.   Musculoskeletal: Negative for neck stiffness.   Skin: Negative for color change and pallor.   Neurological: Negative for seizures and syncope.   Hematological: Negative for adenopathy.   Psychiatric/Behavioral: Negative for hallucinations and suicidal ideas.       I have reviewed and confirmed the accuracy of the ROS as documented by the MA/LPN/RN Eren Brock MD      Objective   /80 (BP Location: Right arm, Patient Position: Sitting)   Pulse 106   Temp 97.7 °F (36.5 °C)   Resp 20   Ht 175.3 cm (69\")   Wt 78.5 kg (173 lb)   SpO2 95%   Breastfeeding No   BMI 25.55 kg/m²   BP Readings from Last 3 Encounters:   02/08/21 120/80   04/06/20 110/62   12/17/19 102/60     Wt Readings from Last 3 Encounters:   02/08/21 78.5 kg (173 lb)   04/06/20 72.4 kg (159 lb 9.6 oz)   12/17/19 74.5 kg (164 lb 3.2 oz)     Physical Exam  Constitutional:       Appearance: Normal appearance. She is " well-developed. She is not diaphoretic.   HENT:      Head: Normocephalic.      Right Ear: Hearing, ear canal and external ear normal. A middle ear effusion is present. Tympanic membrane is erythematous.      Left Ear: Hearing, ear canal and external ear normal. A middle ear effusion is present. Tympanic membrane is erythematous.      Nose: Congestion present.      Right Sinus: Maxillary sinus tenderness and frontal sinus tenderness present.      Left Sinus: Maxillary sinus tenderness and frontal sinus tenderness present.      Mouth/Throat:      Pharynx: Posterior oropharyngeal erythema present.      Tonsils: No tonsillar abscesses. 1+ on the right. 1+ on the left.   Eyes:      General: Lids are normal.      Conjunctiva/sclera: Conjunctivae normal.      Pupils: Pupils are equal, round, and reactive to light.   Neck:      Meningeal: Brudzinski's sign and Kernig's sign absent.   Cardiovascular:      Rate and Rhythm: Normal rate and regular rhythm.      Pulses: Normal pulses.      Heart sounds: Normal heart sounds, S1 normal and S2 normal. No murmur. No friction rub. No gallop.    Pulmonary:      Effort: Pulmonary effort is normal. No accessory muscle usage or respiratory distress.      Breath sounds: No stridor. Examination of the right-upper field reveals wheezing and rhonchi. Examination of the left-upper field reveals wheezing and rhonchi. Examination of the right-middle field reveals wheezing and rhonchi. Examination of the left-middle field reveals wheezing and rhonchi. Examination of the right-lower field reveals wheezing and rhonchi. Examination of the left-lower field reveals wheezing and rhonchi. Wheezing and rhonchi present. No decreased breath sounds or rales.   Abdominal:      General: Bowel sounds are normal. There is no distension.      Palpations: Abdomen is soft. There is no mass.      Tenderness: There is no abdominal tenderness.      Hernia: No hernia is present.   Skin:     General: Skin is warm and  dry.      Coloration: Skin is not pale.   Neurological:      Mental Status: She is alert and oriented to person, place, and time.      Cranial Nerves: No cranial nerve deficit.      Coordination: Coordination normal.      Gait: Gait normal.         Data / Lab Results:    Hemoglobin A1C   Date Value Ref Range Status   08/04/2016 5.20 4.00 - 6.00 % Final   12/02/2015 5.4 4.00 - 6.00 % Final     Comment:     The American Diabetes Association recommends maintenance of Hemoglobin  A1C at 7.0% or lower. Goals for Hemoglobin A1C reduction may need to be  modified if hypoglycemia is a problem.     10/19/2015 5.9 4.00 - 6.00 % Final     Comment:     The American Diabetes Association recommends maintenance of Hemoglobin  A1C at 7.0% or lower. Goals for Hemoglobin A1C reduction may need to be  modified if hypoglycemia is a problem.       Lab Results   Component Value Date    GLU 90 12/17/2019     Lab Results   Component Value Date     (H) 04/29/2019     11/28/2016    LDL 85 08/05/2016     Lab Results   Component Value Date    CHOL 228 (H) 04/29/2019    CHOL 229 (H) 11/28/2016    CHOL 193 08/05/2016     Lab Results   Component Value Date    TRIG 58 04/29/2019    TRIG 92 11/28/2016    TRIG 52 08/05/2016     Lab Results   Component Value Date    HDL 94 04/29/2019    HDL 91 11/28/2016    HDL 90 (H) 08/05/2016     No results found for: PSA  Lab Results   Component Value Date    WBC 4.9 12/17/2019    HGB 13.4 12/17/2019    HCT 40.5 12/17/2019    MCV 88 12/17/2019     12/17/2019     Lab Results   Component Value Date    TSH 1.530 12/17/2019      Lab Results   Component Value Date    GLUCOSE 95 08/04/2016    BUN 20 12/17/2019    CREATININE 1.10 (H) 12/17/2019    EGFRIFNONA 58 (L) 12/17/2019    EGFRIFAFRI 67 12/17/2019    BCR 18 12/17/2019    K 5.0 12/17/2019    CO2 25 12/17/2019    CALCIUM 10.3 (H) 12/17/2019    PROTENTOTREF 6.7 12/17/2019    ALBUMIN 4.6 12/17/2019    LABIL2 2.2 12/17/2019    AST 16 12/17/2019     ALT 13 12/17/2019     No results found for: IVETH, RF, SEDRATE   No results found for: CRP   No results found for: IRON, TIBC, FERRITIN   Lab Results   Component Value Date    [id number] 391 12/17/2019          Assessment/Plan      Medications        Problem List         LOS      COVID-19 viral upper respiratory infection.  Persistent headache, dry cough.  Positive sinus effusion on exam start antibiotics, prednisone follow-up recheck.  Follow-up recheck.  Call return if worsening symptoms.    Anxiety.    Improved on increases dose zoloft.  On Wellbutrin.  Consider counseling.  Good social support.  Follow-up recheck  Shift work disorder.  Start nuvigil, continue melatonin  Vitamin D deficiency.  Replace, follow-up recheck.  Lightheadedness/orthostasis.  h/o hypotension, bradycardia, followed by cardiology / [place], recommend heent eval she declines currently, has had vu in past.  Has had neurology eval per Dr. Hernandez, MRI brain normal 12/19  AV fistula.  Clinically improved status post embolization [facility].  Recheck echo.  Family history of colon cancer.  Recommend repeat colonoscopy.  Malaise and fatigue.  possibly 2nd hypotension, rec fludricortisone, midrodrine, she declines 2nd side effects (has had difficulty tolerating this in past, follwed by cardiology)recommend sleep study, + fh sumaya (father), she exercises twice daily  Bradycardia.  Pacemaker out currently/history of pacemaker infection.  Restless leg syndrome.  Improved on Mirapex.  Cervical disc disease.  MRI 4/20 with ruptured disc C4/5, bulging disc multiple levels.  Recommend neurosurgery eval.  Tinnitus.  HEENT referral scheduled  Emphysematous changes in chest x-ray.  Check pulmonary function testing.  Follow-up recheck  Thyroid nodule.  Small 2 to 3 mm right per CT angiogram neck 2015.  Prior history of right hemithyroidectomy secondary to nodule.  Check thyroid ultrasound.  Recommend follow-up visit for comprehensive physical exam  screening test.      Diagnoses and all orders for this visit:    1. SOB (shortness of breath) (Primary)  -     modafinil (Provigil) 100 MG tablet; Take 1 tablet by mouth Daily.  Dispense: 30 tablet; Refill: 2    2. Fatigue, unspecified type    3. Shift work sleep disorder    4. Thyroid nodule  -     US Thyroid; Future    5. Upper respiratory tract infection, unspecified type  -     cephalexin (KEFLEX) 500 MG capsule; Take 1 capsule by mouth 3 (Three) Times a Day.  Dispense: 30 capsule; Refill: 0  -     predniSONE (DELTASONE) 5 MG tablet; 30mg x 3 days, 20mg x 3 days, 10mg x 3 days, 5mg x 3 days  Dispense: 39 tablet; Refill: 0    6. COVID-19 virus infection              Expected course, medications, and adverse effects discussed.  Call or return if worsening or persistent symptoms.  I wore protective equipment throughout this patient encounter including a mask, gloves, and eye protection.  Hand hygiene was performed before donning protective equipment and after removal when leaving the room. The complete contents of the Assessment and Plan and Data/Lab Results as documented above have been reviewed and addressed by myself with the patient today as part of an ongoing evaluation / treatment plan.  If some of the documentation has been copied from a previous note and is unchanged it indicates that this problem / plan has been assessed today but is stable from a previous visit and no changes have been recommended.

## 2021-02-10 PROBLEM — U07.1 COVID-19 VIRUS INFECTION: Status: ACTIVE | Noted: 2021-02-10

## 2021-02-17 ENCOUNTER — APPOINTMENT (OUTPATIENT)
Dept: ULTRASOUND IMAGING | Facility: HOSPITAL | Age: 56
End: 2021-02-17

## 2021-02-20 DIAGNOSIS — G43.809 OTHER MIGRAINE WITHOUT STATUS MIGRAINOSUS, NOT INTRACTABLE: ICD-10-CM

## 2021-02-20 DIAGNOSIS — F43.0 ACUTE REACTION TO STRESS: ICD-10-CM

## 2021-02-20 DIAGNOSIS — G25.81 RESTLESS LEGS SYNDROME (RLS): ICD-10-CM

## 2021-02-22 RX ORDER — PRAMIPEXOLE DIHYDROCHLORIDE 0.12 MG/1
TABLET ORAL
Qty: 180 TABLET | Refills: 0 | Status: SHIPPED | OUTPATIENT
Start: 2021-02-22 | End: 2021-04-07 | Stop reason: SDUPTHER

## 2021-02-22 RX ORDER — MELOXICAM 15 MG/1
TABLET ORAL
Qty: 90 TABLET | Refills: 0 | Status: SHIPPED | OUTPATIENT
Start: 2021-02-22 | End: 2021-04-07 | Stop reason: SDUPTHER

## 2021-02-22 RX ORDER — BUPROPION HYDROCHLORIDE 300 MG/1
TABLET ORAL
Qty: 90 TABLET | Refills: 0 | Status: SHIPPED | OUTPATIENT
Start: 2021-02-22 | End: 2021-04-07 | Stop reason: SDUPTHER

## 2021-02-22 RX ORDER — TOPIRAMATE 50 MG/1
TABLET, FILM COATED ORAL
Qty: 90 TABLET | Refills: 0 | Status: SHIPPED | OUTPATIENT
Start: 2021-02-22 | End: 2021-04-07 | Stop reason: SDUPTHER

## 2021-03-15 ENCOUNTER — TELEPHONE (OUTPATIENT)
Dept: FAMILY MEDICINE CLINIC | Facility: CLINIC | Age: 56
End: 2021-03-15

## 2021-03-15 NOTE — TELEPHONE ENCOUNTER
----- Message from Joanie Higuera sent at 3/15/2021 10:43 AM EDT -----  Regarding: Referral Request  Contact: [phone]  I need a referral to a dermatologist for a questionable spot on my back. I’d like to go to [facility].

## 2021-03-15 NOTE — TELEPHONE ENCOUNTER
----- Message from Joanie Higuera sent at 3/15/2021 10:48 AM EDT -----  Regarding: Non-Urgent Medical Question  Contact: [phone]  I am still dealing with Covid symptoms. [facility] and will be seeing an audiologist, cardiologist and physical therapy to build my endurance back up.     My question is .... am I good to go ahead and take the vaccine?

## 2021-03-15 NOTE — TELEPHONE ENCOUNTER
"Her other question was \"is she good to get the vaccine for covid although she is in the covid \"long haulers\" program?\"  "

## 2021-03-30 DIAGNOSIS — L98.9 SKIN LESION: Primary | ICD-10-CM

## 2021-04-07 DIAGNOSIS — R06.02 SOB (SHORTNESS OF BREATH): ICD-10-CM

## 2021-04-07 DIAGNOSIS — G43.809 OTHER MIGRAINE WITHOUT STATUS MIGRAINOSUS, NOT INTRACTABLE: ICD-10-CM

## 2021-04-07 DIAGNOSIS — F43.0 ACUTE REACTION TO STRESS: ICD-10-CM

## 2021-04-07 DIAGNOSIS — G25.81 RESTLESS LEGS SYNDROME (RLS): ICD-10-CM

## 2021-04-09 RX ORDER — MODAFINIL 100 MG/1
100 TABLET ORAL DAILY
Qty: 30 TABLET | Refills: 2 | Status: SHIPPED | OUTPATIENT
Start: 2021-04-09 | End: 2021-04-14 | Stop reason: SDUPTHER

## 2021-04-09 RX ORDER — TOPIRAMATE 50 MG/1
50 TABLET, FILM COATED ORAL
Qty: 90 TABLET | Refills: 0 | Status: SHIPPED | OUTPATIENT
Start: 2021-04-09 | End: 2021-07-06

## 2021-04-09 RX ORDER — PRAMIPEXOLE DIHYDROCHLORIDE 0.12 MG/1
.125-.25 TABLET ORAL EVERY EVENING
Qty: 180 TABLET | Refills: 0 | Status: SHIPPED | OUTPATIENT
Start: 2021-04-09 | End: 2021-04-13 | Stop reason: SDUPTHER

## 2021-04-09 RX ORDER — MELOXICAM 15 MG/1
15 TABLET ORAL DAILY
Qty: 90 TABLET | Refills: 0 | Status: SHIPPED | OUTPATIENT
Start: 2021-04-09 | End: 2021-07-12

## 2021-04-09 RX ORDER — BUPROPION HYDROCHLORIDE 300 MG/1
300 TABLET ORAL DAILY
Qty: 90 TABLET | Refills: 0 | Status: SHIPPED | OUTPATIENT
Start: 2021-04-09 | End: 2021-07-06

## 2021-04-13 DIAGNOSIS — G25.81 RESTLESS LEGS SYNDROME (RLS): ICD-10-CM

## 2021-04-13 RX ORDER — PRAMIPEXOLE DIHYDROCHLORIDE 0.12 MG/1
.125-.25 TABLET ORAL EVERY EVENING
Qty: 180 TABLET | Refills: 0 | Status: SHIPPED | OUTPATIENT
Start: 2021-04-13 | End: 2021-07-06

## 2021-04-14 DIAGNOSIS — R06.02 SOB (SHORTNESS OF BREATH): ICD-10-CM

## 2021-04-14 RX ORDER — MODAFINIL 100 MG/1
100 TABLET ORAL DAILY
Qty: 90 TABLET | Refills: 0 | Status: SHIPPED | OUTPATIENT
Start: 2021-04-14 | End: 2021-04-15 | Stop reason: SDUPTHER

## 2021-04-15 DIAGNOSIS — R06.02 SOB (SHORTNESS OF BREATH): ICD-10-CM

## 2021-04-16 RX ORDER — MODAFINIL 100 MG/1
100 TABLET ORAL DAILY
Qty: 90 TABLET | Refills: 0 | Status: SHIPPED | OUTPATIENT
Start: 2021-04-16 | End: 2021-08-05 | Stop reason: SDUPTHER

## 2021-05-24 ENCOUNTER — OFFICE VISIT (OUTPATIENT)
Dept: FAMILY MEDICINE CLINIC | Facility: CLINIC | Age: 56
End: 2021-05-24

## 2021-05-24 VITALS
BODY MASS INDEX: 23.46 KG/M2 | RESPIRATION RATE: 18 BRPM | TEMPERATURE: 97.5 F | HEIGHT: 69 IN | SYSTOLIC BLOOD PRESSURE: 112 MMHG | OXYGEN SATURATION: 99 % | HEART RATE: 81 BPM | DIASTOLIC BLOOD PRESSURE: 80 MMHG | WEIGHT: 158.4 LBS

## 2021-05-24 DIAGNOSIS — F43.23 ACUTE ADJUSTMENT DISORDER WITH MIXED ANXIETY AND DEPRESSED MOOD: ICD-10-CM

## 2021-05-24 DIAGNOSIS — E78.2 MIXED HYPERLIPIDEMIA: ICD-10-CM

## 2021-05-24 DIAGNOSIS — G47.26 SHIFT WORK SLEEP DISORDER: ICD-10-CM

## 2021-05-24 DIAGNOSIS — G25.81 RESTLESS LEGS SYNDROME (RLS): ICD-10-CM

## 2021-05-24 DIAGNOSIS — I95.9 HYPOTENSION, UNSPECIFIED HYPOTENSION TYPE: ICD-10-CM

## 2021-05-24 DIAGNOSIS — Z00.01 ANNUAL VISIT FOR GENERAL ADULT MEDICAL EXAMINATION WITH ABNORMAL FINDINGS: Primary | ICD-10-CM

## 2021-05-24 DIAGNOSIS — Z12.11 SCREENING FOR MALIGNANT NEOPLASM OF COLON: ICD-10-CM

## 2021-05-24 DIAGNOSIS — I77.0 A-V FISTULA (HCC): ICD-10-CM

## 2021-05-24 DIAGNOSIS — Z12.31 ENCOUNTER FOR SCREENING MAMMOGRAM FOR MALIGNANT NEOPLASM OF BREAST: ICD-10-CM

## 2021-05-24 PROCEDURE — 99396 PREV VISIT EST AGE 40-64: CPT | Performed by: FAMILY MEDICINE

## 2021-05-24 PROCEDURE — 99213 OFFICE O/P EST LOW 20 MIN: CPT | Performed by: FAMILY MEDICINE

## 2021-05-28 DIAGNOSIS — F43.0 ACUTE REACTION TO STRESS: ICD-10-CM

## 2021-06-01 RX ORDER — LORAZEPAM 0.5 MG/1
0.5 TABLET ORAL EVERY 6 HOURS PRN
Qty: 15 TABLET | Refills: 1 | Status: SHIPPED | OUTPATIENT
Start: 2021-06-01 | End: 2021-12-22 | Stop reason: SDUPTHER

## 2021-07-05 DIAGNOSIS — F43.0 ACUTE REACTION TO STRESS: ICD-10-CM

## 2021-07-05 DIAGNOSIS — G43.809 OTHER MIGRAINE WITHOUT STATUS MIGRAINOSUS, NOT INTRACTABLE: ICD-10-CM

## 2021-07-05 DIAGNOSIS — G25.81 RESTLESS LEGS SYNDROME (RLS): ICD-10-CM

## 2021-07-06 RX ORDER — BUPROPION HYDROCHLORIDE 300 MG/1
TABLET ORAL
Qty: 90 TABLET | Refills: 0 | Status: SHIPPED | OUTPATIENT
Start: 2021-07-06 | End: 2021-10-11

## 2021-07-06 RX ORDER — PRAMIPEXOLE DIHYDROCHLORIDE 0.12 MG/1
TABLET ORAL
Qty: 180 TABLET | Refills: 0 | Status: SHIPPED | OUTPATIENT
Start: 2021-07-06 | End: 2021-10-11

## 2021-07-06 RX ORDER — TOPIRAMATE 50 MG/1
TABLET, FILM COATED ORAL
Qty: 90 TABLET | Refills: 0 | Status: SHIPPED | OUTPATIENT
Start: 2021-07-06 | End: 2021-12-23

## 2021-07-10 DIAGNOSIS — G25.81 RESTLESS LEGS SYNDROME (RLS): ICD-10-CM

## 2021-07-12 RX ORDER — MELOXICAM 15 MG/1
TABLET ORAL
Qty: 90 TABLET | Refills: 0 | Status: SHIPPED | OUTPATIENT
Start: 2021-07-12 | End: 2021-12-23

## 2021-08-04 ENCOUNTER — TELEPHONE (OUTPATIENT)
Dept: FAMILY MEDICINE CLINIC | Facility: CLINIC | Age: 56
End: 2021-08-04

## 2021-08-04 NOTE — TELEPHONE ENCOUNTER
----- Message from Joanie Higuera sent at 8/4/2021  5:34 AM EDT -----  Regarding: Non-Urgent Medical Question  Contact: [phone]  I was diagnosed with Covid Jan 2 and had lingering symptoms for months. I received the Covid vaccine in May ([employer]).  Symptoms never went away completely, though they finally eased up that I could get back to living.     In the past week and a half symptoms have come back. Should I get tested? Would it be accurate? If so, where is the best place to get it done? Does it matter which kind of test?    Thanks,  Joanie.

## 2021-08-05 DIAGNOSIS — R06.02 SOB (SHORTNESS OF BREATH): ICD-10-CM

## 2021-08-06 RX ORDER — MODAFINIL 100 MG/1
100 TABLET ORAL DAILY
Qty: 90 TABLET | Refills: 0 | Status: SHIPPED | OUTPATIENT
Start: 2021-08-06 | End: 2021-08-23 | Stop reason: SDUPTHER

## 2021-08-13 NOTE — PROGRESS NOTES
Subjective   Joanie Higuera is a 55 y.o. female.     Chief Complaint   Patient presents with   • Headache   • Anxiety   • Dizziness       Joanie reports that she had a near syncopal event August 16, witnessed and described as she mentioned she was dizzy, then in a matter of seconds a quiver, then started slumping, elbows slipped off the table and had a hard time holding your head up.Reported at times her eyes rolled back and was having a hard time with the straw when you wanted a drink. You would come back to for a couple seconds, then head would drop again. She could hear, but struggled with a response. This went on for about 10 minutes. She reports extreme fatigue after episode. She reports this has happened in the past     Headache   This is a chronic problem. The current episode started more than 1 year ago. The problem has been gradually improving. The pain does not radiate. The pain quality is similar to prior headaches. The quality of the pain is described as aching and dull. Associated symptoms include dizziness, facial sweating, muscle aches, nausea, neck pain and tingling. Pertinent negatives include no coughing, drainage, eye redness, eye watering, fever, insomnia or vomiting. The symptoms are aggravated by unknown. Her past medical history is significant for migraine headaches.   Anxiety  Presents for follow-up visit. Symptoms include decreased concentration, dizziness, nausea and shortness of breath. Patient reports no depressed mood, insomnia, irritability or suicidal ideas. The quality of sleep is non-restorative.     Compliance with medications is %.   Dizziness  This is a new problem. The current episode started 1 to 4 weeks ago. The problem occurs intermittently. Associated symptoms include fatigue, headaches, nausea and neck pain. Pertinent negatives include no coughing, fever or vomiting. Nothing aggravates the symptoms. She has tried nothing for the symptoms.            I personally  reviewed and updated the patient's allergies, medications, problem list, and past medical, surgical, social, and family history. I have reviewed and confirmed the accuracy of the History of Present Illness and Review of Symptoms as documented by the MA/LPN/RN. Eren Brock MD    Family History   Problem Relation Age of Onset   • Arrhythmia Mother    • Colon cancer Mother    • Other Mother    • Stroke Father    • Diabetes Father    • Parkinsonism Father    • Seizures Father    • Heart disease Father        Social History     Tobacco Use   • Smoking status: Never Smoker   • Smokeless tobacco: Never Used   Vaping Use   • Vaping Use: Never used   Substance Use Topics   • Alcohol use: Yes     Alcohol/week: 0.0 standard drinks     Comment: social   • Drug use: No       Past Surgical History:   Procedure Laterality Date   • ARTERIOVENOUS FISTULA REPAIR  2015    ENDO REPAIR BY DR KAPLAN   • CARDIAC CATHETERIZATION     • CARDIAC CATHETERIZATION  08/05/2016    RIGHT HEART CATH PER DR. COLEMAN   • CARDIAC CATHETERIZATION N/A 8/5/2016    Procedure: Right Heart Cath;  Surgeon: Holley Coleman MD;  Location: [facility] CATH INVASIVE LOCATION;  Service:    • CARDIAC PACEMAKER REMOVAL  2012    FOR STAPH INFECTION   • FOOT SURGERY Right    • HYSTERECTOMY      ovaries remain   • PACEMAKER IMPLANTATION  1991 - ORIGINAL   • THYROID LOBECTOMY Left        Patient Active Problem List   Diagnosis   • SOB (shortness of breath)   • A-V fistula (CMS/HCC)   • Bursitis of hip   • PONV (postoperative nausea and vomiting)   • Ischemic colitis (CMS/HCC)   • Syncope, cardiogenic   • MRSA infection   • Thyroid nodule   • Arthritis   • Dizziness   • Migraine without status migrainosus, not intractable   • Restless legs syndrome (RLS)   • Menopause   • Pacemaker infection (CMS/HCC)   • Palpitations   • Acute reaction to stress   • Tachycardia   • Allergic rhinitis   • Asthma   • GERD (gastroesophageal reflux disease)   • Heart murmur   •  Mixed hyperlipidemia   • Hypotension   • Overweight   • RLS (restless legs syndrome)   • Acute adjustment disorder with mixed anxiety and depressed mood   • Shift work sleep disorder   • Pacemaker   • COVID-19 virus infection   • Annual visit for general adult medical examination with abnormal findings   • Encounter for screening mammogram for malignant neoplasm of breast   • Screening for malignant neoplasm of colon   • Other headache syndrome         Current Outpatient Medications:   •  buPROPion XL (WELLBUTRIN XL) 300 MG 24 hr tablet, TAKE 1 TABLET BY MOUTH EVERY DAY, Disp: 90 tablet, Rfl: 0  •  cetirizine (ZyrTEC) 10 MG tablet, Take 10 mg by mouth at night as needed for allergies., Disp: , Rfl:   •  gabapentin (NEURONTIN) 300 MG capsule, Take 1 capsule by mouth 3 (Three) Times a Day., Disp: 90 capsule, Rfl: 2  •  LORazepam (ATIVAN) 0.5 MG tablet, Take 1 tablet by mouth Every 6 (Six) Hours As Needed for Anxiety., Disp: 15 tablet, Rfl: 1  •  Magnesium Oxide 400 MG capsule, Take 400 mg by mouth every night., Disp: , Rfl:   •  meloxicam (MOBIC) 15 MG tablet, TAKE 1 TABLET BY MOUTH EVERY DAY, Disp: 90 tablet, Rfl: 0  •  modafinil (PROVIGIL) 200 MG tablet, Take 1 tablet by mouth Daily., Disp: 30 tablet, Rfl: 2  •  Multiple Vitamins-Minerals (MULTIVITAMIN ADULT PO), Take 1 tablet by mouth every morning., Disp: , Rfl:   •  pramipexole (MIRAPEX) 0.125 MG tablet, TAKE 1 TO 2 TABLETS BY MOUTH EVERY EVENING, Disp: 180 tablet, Rfl: 0  •  topiramate (TOPAMAX) 50 MG tablet, TAKE 1 TABLET BY MOUTH AT BEDTIME, Disp: 90 tablet, Rfl: 0  •  vitamin D (ERGOCALCIFEROL) 1.25 MG (13853 UT) capsule capsule, Take 1 capsule by mouth 1 (One) Time Per Week., Disp: 6 capsule, Rfl: 0         Review of Systems   Constitutional: Positive for fatigue. Negative for fever and irritability.   Eyes: Negative for redness.   Respiratory: Positive for shortness of breath. Negative for cough.    Gastrointestinal: Positive for nausea. Negative for  "vomiting.   Musculoskeletal: Positive for neck pain.   Neurological: Positive for dizziness and tingling.   Psychiatric/Behavioral: Positive for decreased concentration. Negative for hallucinations, suicidal ideas and depressed mood. The patient does not have insomnia.        I have reviewed and confirmed the accuracy of the ROS as documented by the MA/LPN/RN Eren Brock MD      Objective   /86   Pulse 90   Temp 97.5 °F (36.4 °C)   Resp 18   Ht 175.3 cm (69\")   Wt 72.1 kg (159 lb)   SpO2 99%   Breastfeeding No   BMI 23.48 kg/m²   BP Readings from Last 3 Encounters:   08/23/21 116/86   05/24/21 112/80   02/08/21 120/80     Wt Readings from Last 3 Encounters:   08/23/21 72.1 kg (159 lb)   05/24/21 71.8 kg (158 lb 6.4 oz)   02/08/21 78.5 kg (173 lb)     Physical Exam  Constitutional:       Appearance: She is well-developed. She is not diaphoretic.   HENT:      Head: Normocephalic.      Right Ear: Tympanic membrane, ear canal and external ear normal.      Left Ear: Tympanic membrane, ear canal and external ear normal.      Nose: Nose normal.   Eyes:      General: Lids are normal.      Conjunctiva/sclera: Conjunctivae normal.      Pupils: Pupils are equal, round, and reactive to light.   Neck:      Thyroid: No thyromegaly.      Vascular: No carotid bruit or JVD.      Trachea: No tracheal deviation.   Cardiovascular:      Rate and Rhythm: Normal rate and regular rhythm.      Heart sounds: Normal heart sounds. No murmur heard.   No friction rub. No gallop.    Pulmonary:      Effort: Pulmonary effort is normal.      Breath sounds: Normal breath sounds. No stridor. No decreased breath sounds, wheezing or rales.   Abdominal:      General: Bowel sounds are normal. There is no distension.      Palpations: Abdomen is soft. There is no mass.      Tenderness: There is no abdominal tenderness. There is no guarding or rebound.      Hernia: No hernia is present.   Lymphadenopathy:      Head:      Right side of head: " No submental, submandibular, tonsillar, preauricular, posterior auricular or occipital adenopathy.      Left side of head: No submental, submandibular, tonsillar, preauricular, posterior auricular or occipital adenopathy.      Cervical: No cervical adenopathy.   Skin:     General: Skin is warm and dry.      Coloration: Skin is not pale.   Neurological:      Mental Status: She is alert and oriented to person, place, and time.      Cranial Nerves: No cranial nerve deficit.      Sensory: No sensory deficit.      Motor: No tremor, abnormal muscle tone or seizure activity.      Coordination: Coordination normal.      Gait: Gait normal.      Deep Tendon Reflexes: Reflexes are normal and symmetric.         Data / Lab Results:    Hemoglobin A1C   Date Value Ref Range Status   08/04/2016 5.20 4.00 - 6.00 % Final   12/02/2015 5.4 4.00 - 6.00 % Final     Comment:     The American Diabetes Association recommends maintenance of Hemoglobin  A1C at 7.0% or lower. Goals for Hemoglobin A1C reduction may need to be  modified if hypoglycemia is a problem.     10/19/2015 5.9 4.00 - 6.00 % Final     Comment:     The American Diabetes Association recommends maintenance of Hemoglobin  A1C at 7.0% or lower. Goals for Hemoglobin A1C reduction may need to be  modified if hypoglycemia is a problem.       Lab Results   Component Value Date    GLU 90 12/17/2019     Lab Results   Component Value Date     (H) 04/29/2019     11/28/2016    LDL 85 08/05/2016     Lab Results   Component Value Date    CHOL 228 (H) 04/29/2019    CHOL 229 (H) 11/28/2016    CHOL 193 08/05/2016     Lab Results   Component Value Date    TRIG 58 04/29/2019    TRIG 92 11/28/2016    TRIG 52 08/05/2016     Lab Results   Component Value Date    HDL 94 04/29/2019    HDL 91 11/28/2016    HDL 90 (H) 08/05/2016     No results found for: PSA  Lab Results   Component Value Date    WBC 5.19 02/25/2021    HGB 14.3 02/25/2021    HCT 45.0 02/25/2021    MCV 91.1 02/25/2021      02/25/2021     Lab Results   Component Value Date    TSH 1.680 02/25/2021      Lab Results   Component Value Date    GLUCOSE 95 08/04/2016    BUN 20 12/17/2019    CREATININE 1.10 (H) 12/17/2019    EGFRIFNONA 58 (L) 12/17/2019    EGFRIFAFRI 67 12/17/2019    BCR 18 12/17/2019    K 5.0 12/17/2019    CO2 25 12/17/2019    CALCIUM 10.3 (H) 12/17/2019    PROTENTOTREF 6.7 12/17/2019    ALBUMIN 4.6 12/17/2019    LABIL2 2.2 12/17/2019    AST 16 12/17/2019    ALT 13 12/17/2019     No results found for: IVETH, RF, SEDRATE   No results found for: CRP   No results found for: IRON, TIBC, FERRITIN   Lab Results   Component Value Date    QNVOYISR33 1,403 (H) 02/25/2021          Assessment/Plan      Medications        Problem List         LOS      Health Maintenance.  Doing well.  Recommend update tetanus vaccine at pharmacy.  Discussed coated baby aspirin daily, discussed calcium and vitamin D.  Discussed health maintenance, screening test, lifestyle modification.  Followed by OB/GYN, recommend OB/GYN follow-up she states she will consider.  Recommend mammogram she declined secondary to cost.  COVID-19 viral upper respiratory infection.    Improved today/making steady progress.  Has had eval per [facility].  Anxiety.   Has done well on Zoloft in the past, consider restart.  On Wellbutrin.  Consider counseling.  Good social support.  Follow-up recheck  Shift work disorder.  Improved on nuvigil, dose increased, continue melatonin  Vitamin D deficiency.  Replace, follow-up recheck.  Mixed hyperlipidemia.  Recommend recheck fasting labs/order given.  Lightheadedness/orthostasis.  h/o hypotension, bradycardia, followed by cardiology / [place], recommend heent eval she declines currently, has had vu in past.  Has had neurology eval per Dr. Hernandez, MRI brain normal 12/19  AV fistula.  Clinically improved status post embolization [facility].  Recheck echo.  Malaise and fatigue.  possibly 2nd hypotension, rec  fludricortisone, midrodrine, she declines 2nd side effects (has had difficulty tolerating this in past, follwed by cardiology)recommend sleep study, + fh sumaya (father), she exercises twice daily  Bradycardia.  Pacemaker out currently/history of pacemaker infection.  Recurrent episode of syncope has cardiology follow-up scheduled/consider ambulatory monitoring.  Increase fluid intake.  Blood work per [facility] was reviewed/benign 2/21.  Restless leg syndrome.  Improved on Mirapex.  Cervical disc disease.  MRI 4/20 with ruptured disc C4/5, bulging disc multiple levels.  Recommend neurosurgery eval.  Tinnitus.  HEENT referral scheduled  Emphysematous changes in chest x-ray.  Check pulmonary function testing.  Follow-up recheck  Thyroid nodule.  Small 2 to 3 mm right per CT angiogram neck 2015.  Prior history of right hemithyroidectomy secondary to nodule.  Recommend thyroid ultrasound she declines currently secondary to cost states she will consider in future.  Family history colon cancer.  Her father.  History of benign colonoscopy 2011.  Recommend repeat colonoscopy/GI referral scheduled.      Diagnoses and all orders for this visit:    1. Acute reaction to stress (Primary)    2. Syncope, cardiogenic    3. Other headache syndrome    4. Overweight    5. SOB (shortness of breath)  -     modafinil (PROVIGIL) 200 MG tablet; Take 1 tablet by mouth Daily.  Dispense: 30 tablet; Refill: 2    6. Restless legs syndrome (RLS)  -     gabapentin (NEURONTIN) 300 MG capsule; Take 1 capsule by mouth 3 (Three) Times a Day.  Dispense: 90 capsule; Refill: 2    7. Other migraine without status migrainosus, not intractable  -     gabapentin (NEURONTIN) 300 MG capsule; Take 1 capsule by mouth 3 (Three) Times a Day.  Dispense: 90 capsule; Refill: 2              Expected course, medications, and adverse effects discussed.  Call or return if worsening or persistent symptoms.  I wore protective equipment throughout this patient encounter  including a mask, gloves, and eye protection.  Hand hygiene was performed before donning protective equipment and after removal when leaving the room. The complete contents of the Assessment and Plan and Data/Lab Results as documented above have been reviewed and addressed by myself with the patient today as part of an ongoing evaluation / treatment plan.  If some of the documentation has been copied from a previous note and is unchanged it indicates that this problem / plan has been assessed today but is stable from a previous visit and no changes have been recommended.

## 2021-08-23 ENCOUNTER — OFFICE VISIT (OUTPATIENT)
Dept: FAMILY MEDICINE CLINIC | Facility: CLINIC | Age: 56
End: 2021-08-23

## 2021-08-23 VITALS
DIASTOLIC BLOOD PRESSURE: 86 MMHG | TEMPERATURE: 97.5 F | SYSTOLIC BLOOD PRESSURE: 116 MMHG | HEIGHT: 69 IN | HEART RATE: 90 BPM | BODY MASS INDEX: 23.55 KG/M2 | OXYGEN SATURATION: 99 % | RESPIRATION RATE: 18 BRPM | WEIGHT: 159 LBS

## 2021-08-23 DIAGNOSIS — G25.81 RESTLESS LEGS SYNDROME (RLS): ICD-10-CM

## 2021-08-23 DIAGNOSIS — R06.02 SOB (SHORTNESS OF BREATH): ICD-10-CM

## 2021-08-23 DIAGNOSIS — G44.89 OTHER HEADACHE SYNDROME: ICD-10-CM

## 2021-08-23 DIAGNOSIS — R55 SYNCOPE, CARDIOGENIC: ICD-10-CM

## 2021-08-23 DIAGNOSIS — G43.809 OTHER MIGRAINE WITHOUT STATUS MIGRAINOSUS, NOT INTRACTABLE: ICD-10-CM

## 2021-08-23 DIAGNOSIS — E66.3 OVERWEIGHT: ICD-10-CM

## 2021-08-23 DIAGNOSIS — F43.0 ACUTE REACTION TO STRESS: Primary | ICD-10-CM

## 2021-08-23 PROCEDURE — 99214 OFFICE O/P EST MOD 30 MIN: CPT | Performed by: FAMILY MEDICINE

## 2021-08-23 RX ORDER — GABAPENTIN 300 MG/1
300 CAPSULE ORAL 3 TIMES DAILY
Qty: 90 CAPSULE | Refills: 2 | Status: SHIPPED | OUTPATIENT
Start: 2021-08-23 | End: 2021-12-02 | Stop reason: SDUPTHER

## 2021-08-23 RX ORDER — MODAFINIL 200 MG/1
200 TABLET ORAL DAILY
Qty: 30 TABLET | Refills: 2 | Status: SHIPPED | OUTPATIENT
Start: 2021-08-23 | End: 2021-12-22 | Stop reason: SDUPTHER

## 2021-09-03 NOTE — PROGRESS NOTES
Encounter Date:09/08/2021    Location: Eren Otero MD    Patient ID: Joanie Higuera is a 55 y.o. female resident of [place], [place].    1965  Subjective:      Chief Complaint/Reason for visit:    Chief Complaint   Patient presents with   • Shortness of Breath     Consult       Problem List:  1. Neurocardiogenic syncope status post pacemaker placement        A.  Pacemaker maker implantation in 1991        B.  Pacemaker explant 2012 secondary to staph infection  2. Left subclavian artery/brachiocephalic vein AV fistula         A.  Cardiac catheterization 10/23/2015: Increased CO 9.8 L/m and CI 5.7 L/m with significant left-to-right flow as indicated by the marked increase in O2 sat at level of RA, RV, and pulmonary artery. 70-80% proximal stenosis in JERZY. Widely patent subclavian and vertebral arteries.        B.  Status post complex coiling with [place] by Dr. Moyer and Dr. Mata  C.  Cardiac catheterization 8/2/2016: Complete occlusion of LIMA to left SVC fistula following coiling. NL CO and CI.  4.  Surgical history:  A.  Hysterectomy  B.  Left thyroid lobectomy 2006  No Known Allergies      HPI: Joanie is a 55 yr old white female who presents in consultation for further evaluation of a recent episode of dizziness with associated unresponsiveness that occurred on August 16th.She states that on that date she became dizzy, slumped over,eyes rolled back in her head, could hear what her friend was saying, but wasn't able to respond.  This episode lasted for 10 minutes. Prior to that episode (2 years) and currently she has episodes of fleeting dizziness that occurs on a daily basis. She states that her blood pressure has been normal and these symptoms are different from her previous neurocardiogenic spells.  The symptoms are more similar to what she had prior to her fistula repair.  She notes shortness of breath on a daily basis and its worse with exertion. She has recordings of an O2  saturation when she has SOB and it is often in the upper 80's.    Social History     Socioeconomic History   • Marital status: Single     Spouse name: Not on file   • Number of children: Not on file   • Years of education: Not on file   • Highest education level: Not on file   Tobacco Use   • Smoking status: Never Smoker   • Smokeless tobacco: Never Used   Vaping Use   • Vaping Use: Never used   Substance and Sexual Activity   • Alcohol use: Yes     Alcohol/week: 5.0 standard drinks     Types: 3 Glasses of wine, 2 Standard drinks or equivalent per week     Comment: social   • Drug use: No   • Sexual activity: Not Currently       family history includes Arrhythmia in her mother; Colon cancer in her mother; Diabetes in her father; Heart disease in her father; Other in her mother; Parkinsonism in her father; Seizures in her father; Stroke in her father.     has a past medical history of A-V fistula (CMS/[facility]), Acute adjustment disorder with mixed anxiety and depressed mood (12/17/2019), Acute reaction to stress, Allergic rhinitis, Arthritis, Asthma, Bursitis of hip, Depression, GERD (gastroesophageal reflux disease), Heart murmur, Heart murmur, Hyperlipemia, Hypertension, Hypotension, Ischemic colitis (CMS/[facility]), Migraines, Mitral valve prolapse (1992), MRSA infection (2011), Overweight, PONV (postoperative nausea and vomiting), Restless leg syndrome, RLS (restless legs syndrome), Spinal headache, Syncope, cardiogenic (1991), Tachycardia, and Thyroid nodule.    No Known Allergies      Current Outpatient Medications:   •  buPROPion XL (WELLBUTRIN XL) 300 MG 24 hr tablet, TAKE 1 TABLET BY MOUTH EVERY DAY, Disp: 90 tablet, Rfl: 0  •  cetirizine (ZyrTEC) 10 MG tablet, Take 10 mg by mouth at night as needed for allergies., Disp: , Rfl:   •  gabapentin (NEURONTIN) 300 MG capsule, Take 1 capsule by mouth 3 (Three) Times a Day., Disp: 90 capsule, Rfl: 2  •  LORazepam (ATIVAN) 0.5 MG tablet, Take 1 tablet by mouth Every 6 (Six)  "Hours As Needed for Anxiety., Disp: 15 tablet, Rfl: 1  •  Magnesium Oxide 400 MG capsule, Take 400 mg by mouth As Needed., Disp: , Rfl:   •  meloxicam (MOBIC) 15 MG tablet, TAKE 1 TABLET BY MOUTH EVERY DAY, Disp: 90 tablet, Rfl: 0  •  modafinil (PROVIGIL) 200 MG tablet, Take 1 tablet by mouth Daily. (Patient taking differently: Take 200 mg by mouth As Needed.), Disp: 30 tablet, Rfl: 2  •  Multiple Vitamins-Minerals (MULTIVITAMIN ADULT PO), Take 1 tablet by mouth every morning., Disp: , Rfl:   •  pramipexole (MIRAPEX) 0.125 MG tablet, TAKE 1 TO 2 TABLETS BY MOUTH EVERY EVENING, Disp: 180 tablet, Rfl: 0  •  topiramate (TOPAMAX) 50 MG tablet, TAKE 1 TABLET BY MOUTH AT BEDTIME, Disp: 90 tablet, Rfl: 0  •  vitamin D (ERGOCALCIFEROL) 1.25 MG (69968 UT) capsule capsule, Take 1 capsule by mouth 1 (One) Time Per Week., Disp: 6 capsule, Rfl: 0    Review of Systems   Constitutional: Positive for weight gain.   Eyes: Negative.    Cardiovascular: Positive for dyspnea on exertion and near-syncope.   Respiratory: Positive for shortness of breath.    Endocrine: Negative.    Hematologic/Lymphatic: Negative.    Skin: Negative.    Musculoskeletal: Positive for arthritis.   Gastrointestinal: Negative.    Genitourinary: Negative.    Neurological: Positive for difficulty with concentration, excessive daytime sleepiness, dizziness, headaches, light-headedness and loss of balance.   Psychiatric/Behavioral: Negative.    Allergic/Immunologic: Negative.        Vitals:    09/08/21 0851   BP: 110/72   BP Location: Left arm   Patient Position: Sitting   Pulse: 98   SpO2: 97%   Weight: 72.3 kg (159 lb 6.4 oz)   Height: 175.3 cm (69\")         Objective:       Vitals reviewed.   Constitutional:       Appearance: Healthy appearance. Not in distress.   Eyes:      Pupils: Pupils are equal, round, and reactive to light.   HENT:    Mouth/Throat:      Pharynx: Oropharynx is clear.   Pulmonary:      Effort: Pulmonary effort is normal.      Breath sounds: " Normal breath sounds.   Cardiovascular:      PMI at left midclavicular line. Normal rate. Regular rhythm.      Murmurs: There is no murmur.      No gallop. No click. No rub.   Pulses:     Intact distal pulses.   Abdominal:      General: Bowel sounds are normal.      Palpations: Abdomen is soft.   Musculoskeletal: Normal range of motion.      Cervical back: Normal range of motion. Skin:     General: Skin is warm and dry.   Neurological:      General: No focal deficit present.      Mental Status: Alert and oriented to person, place and time.         Data Review:     ECG 12 Lead    Date/Time: 9/8/2021 9:18 AM  Performed by: Holley Mata MD  Authorized by: Holley Mata MD   Comparison: compared with previous ECG from 3/5/2018  Similar to previous ECG  Rhythm: sinus rhythm  Rate: normal  BPM: 87  Conduction: conduction normal  QRS axis: normal    Clinical impression: normal ECG               Assessment:      Diagnosis Plan   1. Dizziness     2. SOB (shortness of breath)  Adult Transthoracic Echo Complete W/ Cont if Necessary Per Protocol    Mobile Cardiac Outpatient Telemetry   3. Syncope, cardiogenic     4. A-V fistula (CMS/HCC)     5. Palpitations  Adult Transthoracic Echo Complete W/ Cont if Necessary Per Protocol    Mobile Cardiac Outpatient Telemetry     Plan:   1. Monitor HR,B/P and O2 saturations with symptoms of dizziness and shortness of breath.  2. MCOT for evaluation of palpitations and dizziness.  3. Echocardiogram  4. CT scan of chest per PE protocol.  5. Follow up in 6 weeks.  6.  Consider neurologic evaluation      Scribed for Holley Mata MD by Mary Louis RN. 9/8/2021  09:34 EDT      I Holley Mata MD personally performed the services described in this documentation as scribed by the above individual in my presence, and it is both accurate and complete.    Holley Mata MD, [facility]    Please note that portions of this note may have been completed with a voice  recognition program. Efforts were made to edit the dictations, but occasionally words are mistranscribed.

## 2021-09-08 ENCOUNTER — HOSPITAL ENCOUNTER (OUTPATIENT)
Dept: CT IMAGING | Facility: HOSPITAL | Age: 56
Discharge: HOME | End: 2021-09-08
Admitting: INTERNAL MEDICINE

## 2021-09-08 ENCOUNTER — OFFICE VISIT (OUTPATIENT)
Dept: CARDIOLOGY | Facility: CLINIC | Age: 56
End: 2021-09-08

## 2021-09-08 VITALS
BODY MASS INDEX: 23.61 KG/M2 | HEIGHT: 69 IN | SYSTOLIC BLOOD PRESSURE: 110 MMHG | OXYGEN SATURATION: 97 % | WEIGHT: 159.4 LBS | DIASTOLIC BLOOD PRESSURE: 72 MMHG | HEART RATE: 98 BPM

## 2021-09-08 DIAGNOSIS — R55 SYNCOPE, CARDIOGENIC: ICD-10-CM

## 2021-09-08 DIAGNOSIS — I77.0 A-V FISTULA (HCC): ICD-10-CM

## 2021-09-08 DIAGNOSIS — R06.02 SOB (SHORTNESS OF BREATH): ICD-10-CM

## 2021-09-08 DIAGNOSIS — R42 DIZZINESS: Primary | ICD-10-CM

## 2021-09-08 DIAGNOSIS — R00.2 PALPITATIONS: ICD-10-CM

## 2021-09-08 PROCEDURE — 99204 OFFICE O/P NEW MOD 45 MIN: CPT | Performed by: INTERNAL MEDICINE

## 2021-09-08 PROCEDURE — 0 IOPAMIDOL PER 1 ML: Performed by: INTERNAL MEDICINE

## 2021-09-08 PROCEDURE — 71275 CT ANGIOGRAPHY CHEST: CPT

## 2021-09-08 PROCEDURE — 93000 ELECTROCARDIOGRAM COMPLETE: CPT | Performed by: INTERNAL MEDICINE

## 2021-09-08 RX ADMIN — IOPAMIDOL 58 ML: 755 INJECTION, SOLUTION INTRAVENOUS at 14:41

## 2021-10-09 DIAGNOSIS — F43.0 ACUTE REACTION TO STRESS: ICD-10-CM

## 2021-10-09 DIAGNOSIS — G25.81 RESTLESS LEGS SYNDROME (RLS): ICD-10-CM

## 2021-10-11 RX ORDER — BUPROPION HYDROCHLORIDE 300 MG/1
TABLET ORAL
Qty: 90 TABLET | Refills: 0 | Status: SHIPPED | OUTPATIENT
Start: 2021-10-11 | End: 2021-12-22 | Stop reason: SDUPTHER

## 2021-10-11 RX ORDER — PRAMIPEXOLE DIHYDROCHLORIDE 0.12 MG/1
TABLET ORAL
Qty: 180 TABLET | Refills: 0 | Status: SHIPPED | OUTPATIENT
Start: 2021-10-11 | End: 2021-12-22 | Stop reason: SDUPTHER
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
## 2021-10-20 ENCOUNTER — OFFICE VISIT (OUTPATIENT)
Dept: CARDIOLOGY | Facility: CLINIC | Age: 56
End: 2021-10-20

## 2021-10-20 ENCOUNTER — HOSPITAL ENCOUNTER (OUTPATIENT)
Dept: CARDIOLOGY | Facility: HOSPITAL | Age: 56
Discharge: HOME | End: 2021-10-20
Admitting: INTERNAL MEDICINE

## 2021-10-20 VITALS
HEIGHT: 69 IN | WEIGHT: 162 LBS | DIASTOLIC BLOOD PRESSURE: 82 MMHG | BODY MASS INDEX: 23.99 KG/M2 | SYSTOLIC BLOOD PRESSURE: 122 MMHG | OXYGEN SATURATION: 97 % | HEART RATE: 93 BPM

## 2021-10-20 DIAGNOSIS — R00.2 PALPITATIONS: ICD-10-CM

## 2021-10-20 DIAGNOSIS — R06.02 SOB (SHORTNESS OF BREATH): ICD-10-CM

## 2021-10-20 DIAGNOSIS — R55 SYNCOPE, CARDIOGENIC: Primary | ICD-10-CM

## 2021-10-20 DIAGNOSIS — I77.0 A-V FISTULA (HCC): ICD-10-CM

## 2021-10-20 DIAGNOSIS — R06.02 SHORTNESS OF BREATH: ICD-10-CM

## 2021-10-20 LAB
BH CV ECHO MEAS - AO MAX PG (FULL): 3 MMHG
BH CV ECHO MEAS - AO MAX PG: 5.6 MMHG
BH CV ECHO MEAS - AO MEAN PG (FULL): 1.9 MMHG
BH CV ECHO MEAS - AO MEAN PG: 3.4 MMHG
BH CV ECHO MEAS - AO ROOT AREA (BSA CORRECTED): 1.8
BH CV ECHO MEAS - AO ROOT AREA: 8.5 CM^2
BH CV ECHO MEAS - AO ROOT DIAM: 3.3 CM
BH CV ECHO MEAS - AO V2 MAX: 118.1 CM/SEC
BH CV ECHO MEAS - AO V2 MEAN: 87.5 CM/SEC
BH CV ECHO MEAS - AO V2 VTI: 31.1 CM
BH CV ECHO MEAS - AVA(I,A): 1.8 CM^2
BH CV ECHO MEAS - AVA(I,D): 2 CM^2
BH CV ECHO MEAS - AVA(V,A): 2.2 CM^2
BH CV ECHO MEAS - AVA(V,D): 2.2 CM^2
BH CV ECHO MEAS - BSA(HAYCOCK): 1.9 M^2
BH CV ECHO MEAS - BSA: 1.9 M^2
BH CV ECHO MEAS - BZI_BMI: 23.5 KILOGRAMS/M^2
BH CV ECHO MEAS - BZI_METRIC_HEIGHT: 175.3 CM
BH CV ECHO MEAS - BZI_METRIC_WEIGHT: 72.1 KG
BH CV ECHO MEAS - EDV(CUBED): 86.3 ML
BH CV ECHO MEAS - EDV(MOD-SP2): 99 ML
BH CV ECHO MEAS - EDV(MOD-SP4): 118 ML
BH CV ECHO MEAS - EDV(TEICH): 88.6 ML
BH CV ECHO MEAS - EF(CUBED): 62.7 %
BH CV ECHO MEAS - EF(MOD-BP): 56 %
BH CV ECHO MEAS - EF(MOD-SP2): 45.5 %
BH CV ECHO MEAS - EF(MOD-SP4): 65.3 %
BH CV ECHO MEAS - EF(TEICH): 54.4 %
BH CV ECHO MEAS - ESV(CUBED): 32.2 ML
BH CV ECHO MEAS - ESV(MOD-SP2): 54 ML
BH CV ECHO MEAS - ESV(MOD-SP4): 41 ML
BH CV ECHO MEAS - ESV(TEICH): 40.4 ML
BH CV ECHO MEAS - FS: 28 %
BH CV ECHO MEAS - IVS/LVPW: 0.85
BH CV ECHO MEAS - IVSD: 0.71 CM
BH CV ECHO MEAS - LA DIMENSION: 3.1 CM
BH CV ECHO MEAS - LA/AO: 0.94
BH CV ECHO MEAS - LAT PEAK E' VEL: 9.1 CM/SEC
BH CV ECHO MEAS - LATERAL E/E' RATIO: 7.1
BH CV ECHO MEAS - LV DIASTOLIC VOL/BSA (35-75): 63 ML/M^2
BH CV ECHO MEAS - LV IVRT: 0.1 SEC
BH CV ECHO MEAS - LV MASS(C)D: 105.2 GRAMS
BH CV ECHO MEAS - LV MASS(C)DI: 56.1 GRAMS/M^2
BH CV ECHO MEAS - LV MAX PG: 2.6 MMHG
BH CV ECHO MEAS - LV MEAN PG: 1.5 MMHG
BH CV ECHO MEAS - LV SYSTOLIC VOL/BSA (12-30): 21.9 ML/M^2
BH CV ECHO MEAS - LV V1 MAX: 80.9 CM/SEC
BH CV ECHO MEAS - LV V1 MEAN: 58.3 CM/SEC
BH CV ECHO MEAS - LV V1 VTI: 18.3 CM
BH CV ECHO MEAS - LVIDD: 4.4 CM
BH CV ECHO MEAS - LVIDS: 3.2 CM
BH CV ECHO MEAS - LVLD AP2: 7.8 CM
BH CV ECHO MEAS - LVLD AP4: 8.1 CM
BH CV ECHO MEAS - LVLS AP2: 6.9 CM
BH CV ECHO MEAS - LVLS AP4: 6.5 CM
BH CV ECHO MEAS - LVOT AREA (M): 3.1 CM^2
BH CV ECHO MEAS - LVOT AREA: 3.1 CM^2
BH CV ECHO MEAS - LVOT DIAM: 2 CM
BH CV ECHO MEAS - LVPWD: 0.83 CM
BH CV ECHO MEAS - MED PEAK E' VEL: 7.6 CM/SEC
BH CV ECHO MEAS - MEDIAL E/E' RATIO: 8.6
BH CV ECHO MEAS - MV A MAX VEL: 77.6 CM/SEC
BH CV ECHO MEAS - MV DEC TIME: 0.14 SEC
BH CV ECHO MEAS - MV E MAX VEL: 66.1 CM/SEC
BH CV ECHO MEAS - MV E/A: 0.85
BH CV ECHO MEAS - MV MAX PG: 1.9 MMHG
BH CV ECHO MEAS - MV MEAN PG: 1.1 MMHG
BH CV ECHO MEAS - MV V2 MAX: 68.1 CM/SEC
BH CV ECHO MEAS - MV V2 MEAN: 51.6 CM/SEC
BH CV ECHO MEAS - MV V2 VTI: 18.3 CM
BH CV ECHO MEAS - MVA(VTI): 3.1 CM^2
BH CV ECHO MEAS - PA ACC SLOPE: 411.4 CM/SEC^2
BH CV ECHO MEAS - PA ACC TIME: 0.16 SEC
BH CV ECHO MEAS - PA MAX PG: 2.1 MMHG
BH CV ECHO MEAS - PA PR(ACCEL): 6.1 MMHG
BH CV ECHO MEAS - PA V2 MAX: 72.2 CM/SEC
BH CV ECHO MEAS - RAP SYSTOLE: 3 MMHG
BH CV ECHO MEAS - RVSP: 17 MMHG
BH CV ECHO MEAS - SI(AO): 140.5 ML/M^2
BH CV ECHO MEAS - SI(CUBED): 28.9 ML/M^2
BH CV ECHO MEAS - SI(LVOT): 30.7 ML/M^2
BH CV ECHO MEAS - SI(MOD-SP2): 24 ML/M^2
BH CV ECHO MEAS - SI(MOD-SP4): 41.1 ML/M^2
BH CV ECHO MEAS - SI(TEICH): 25.7 ML/M^2
BH CV ECHO MEAS - SV(AO): 263.3 ML
BH CV ECHO MEAS - SV(CUBED): 54.1 ML
BH CV ECHO MEAS - SV(LVOT): 57.5 ML
BH CV ECHO MEAS - SV(MOD-SP2): 45 ML
BH CV ECHO MEAS - SV(MOD-SP4): 77 ML
BH CV ECHO MEAS - SV(TEICH): 48.2 ML
BH CV ECHO MEAS - TAPSE (>1.6): 2.1 CM
BH CV ECHO MEAS - TR MAX PG: 14 MMHG
BH CV ECHO MEAS - TR MAX VEL: 188.9 CM/SEC
BH CV ECHO MEASUREMENTS AVERAGE E/E' RATIO: 7.92
BH CV XLRA - RV BASE: 3 CM
BH CV XLRA - RV LENGTH: 6.2 CM
BH CV XLRA - RV MID: 2 CM
BH CV XLRA - TDI S': 11.1 CM/SEC
LEFT ATRIUM VOLUME INDEX: 29 ML/M2
LV EF 2D ECHO EST: 55 %
MAXIMAL PREDICTED HEART RATE: 167 BPM
STRESS TARGET HR: 142 BPM

## 2021-10-20 PROCEDURE — 93306 TTE W/DOPPLER COMPLETE: CPT

## 2021-10-20 PROCEDURE — 93306 TTE W/DOPPLER COMPLETE: CPT | Performed by: INTERNAL MEDICINE

## 2021-10-20 PROCEDURE — 99214 OFFICE O/P EST MOD 30 MIN: CPT | Performed by: INTERNAL MEDICINE

## 2021-10-28 ENCOUNTER — PREP FOR SURGERY (OUTPATIENT)
Dept: OTHER | Facility: HOSPITAL | Age: 56
End: 2021-10-28

## 2021-10-28 DIAGNOSIS — R55 SYNCOPE, CARDIOGENIC: Primary | ICD-10-CM

## 2021-10-28 RX ORDER — SODIUM CHLORIDE 0.9 % (FLUSH) 0.9 %
3 SYRINGE (ML) INJECTION EVERY 12 HOURS SCHEDULED
Status: CANCELLED | OUTPATIENT
Start: 2021-10-28

## 2021-10-28 RX ORDER — SODIUM CHLORIDE 9 MG/ML
3 INJECTION, SOLUTION INTRAVENOUS CONTINUOUS
Status: CANCELLED | OUTPATIENT
Start: 2021-10-28 | End: 2021-10-28

## 2021-10-28 RX ORDER — SODIUM CHLORIDE 0.9 % (FLUSH) 0.9 %
3-10 SYRINGE (ML) INJECTION AS NEEDED
Status: CANCELLED | OUTPATIENT
Start: 2021-10-28

## 2021-10-31 ENCOUNTER — HOSPITAL ENCOUNTER (EMERGENCY)
Facility: HOSPITAL | Age: 56
Discharge: HOME | End: 2021-10-31

## 2021-10-31 ENCOUNTER — TRANSCRIBE ORDERS (OUTPATIENT)
Dept: ADMINISTRATIVE | Facility: HOSPITAL | Age: 56
End: 2021-10-31

## 2021-10-31 ENCOUNTER — LAB (OUTPATIENT)
Dept: LAB | Facility: HOSPITAL | Age: 56
End: 2021-10-31

## 2021-10-31 DIAGNOSIS — Z01.818 PREOP EXAMINATION: ICD-10-CM

## 2021-10-31 LAB — SARS-COV-2 ORF1AB RESP QL NAA+PROBE: NOT DETECTED

## 2021-10-31 PROCEDURE — U0004 COV-19 TEST NON-CDC HGH THRU: HCPCS

## 2021-10-31 PROCEDURE — 99211 OFF/OP EST MAY X REQ PHY/QHP: CPT

## 2021-10-31 PROCEDURE — C9803 HOPD COVID-19 SPEC COLLECT: HCPCS

## 2021-11-02 ENCOUNTER — HOSPITAL ENCOUNTER (OUTPATIENT)
Facility: HOSPITAL | Age: 56
Discharge: HOME | End: 2021-11-02
Attending: INTERNAL MEDICINE | Admitting: INTERNAL MEDICINE

## 2021-11-02 ENCOUNTER — HOSPITAL ENCOUNTER (OUTPATIENT)
Dept: CARDIOLOGY | Facility: HOSPITAL | Age: 56
Discharge: HOME | End: 2021-11-02

## 2021-11-02 VITALS
BODY MASS INDEX: 24.14 KG/M2 | HEIGHT: 69 IN | OXYGEN SATURATION: 97 % | SYSTOLIC BLOOD PRESSURE: 115 MMHG | RESPIRATION RATE: 16 BRPM | TEMPERATURE: 97.6 F | HEART RATE: 74 BPM | DIASTOLIC BLOOD PRESSURE: 76 MMHG | WEIGHT: 163 LBS

## 2021-11-02 DIAGNOSIS — Z00.6 ENCOUNTER FOR EXAMINATION FOR NORMAL COMPARISON OR CONTROL IN CLINICAL RESEARCH PROGRAM: ICD-10-CM

## 2021-11-02 DIAGNOSIS — R06.02 SHORTNESS OF BREATH: ICD-10-CM

## 2021-11-02 DIAGNOSIS — I77.0 A-V FISTULA (HCC): ICD-10-CM

## 2021-11-02 DIAGNOSIS — R55 SYNCOPE, CARDIOGENIC: ICD-10-CM

## 2021-11-02 LAB
ANION GAP SERPL CALCULATED.3IONS-SCNC: 12 MMOL/L (ref 5–15)
ARTERIAL PATENCY WRIST A: ABNORMAL
ATMOSPHERIC PRESS: ABNORMAL MM[HG]
BASE EXCESS BLDA CALC-SCNC: -0.1 MMOL/L (ref 0–2)
BASE EXCESS BLDA CALC-SCNC: -0.3 MMOL/L (ref 0–2)
BASE EXCESS BLDA CALC-SCNC: -1.1 MMOL/L (ref 0–2)
BASE EXCESS BLDA CALC-SCNC: 0 MMOL/L (ref 0–2)
BASE EXCESS BLDA CALC-SCNC: 0.1 MMOL/L (ref 0–2)
BASOPHILS # BLD AUTO: 0.03 10*3/MM3 (ref 0–0.2)
BASOPHILS NFR BLD AUTO: 0.5 % (ref 0–1.5)
BDY SITE: ABNORMAL
BODY TEMPERATURE: 37 C
BUN SERPL-MCNC: 20 MG/DL (ref 6–20)
BUN/CREAT SERPL: 23.3 (ref 7–25)
CALCIUM SPEC-SCNC: 10.1 MG/DL (ref 8.6–10.5)
CHLORIDE SERPL-SCNC: 102 MMOL/L (ref 98–107)
CHOLEST SERPL-MCNC: 241 MG/DL (ref 0–200)
CO2 BLDA-SCNC: 26.3 MMOL/L (ref 22–33)
CO2 BLDA-SCNC: 27.8 MMOL/L (ref 22–33)
CO2 BLDA-SCNC: 27.8 MMOL/L (ref 22–33)
CO2 BLDA-SCNC: 28 MMOL/L (ref 22–33)
CO2 BLDA-SCNC: 28.3 MMOL/L (ref 22–33)
CO2 SERPL-SCNC: 26 MMOL/L (ref 22–29)
COHGB MFR BLD: 1.2 % (ref 0–2)
COHGB MFR BLD: 1.3 % (ref 0–2)
CREAT BLDA-MCNC: 0.8 MG/DL (ref 0.6–1.3)
CREAT SERPL-MCNC: 0.86 MG/DL (ref 0.57–1)
DEPRECATED RDW RBC AUTO: 41.7 FL (ref 37–54)
EOSINOPHIL # BLD AUTO: 0.07 10*3/MM3 (ref 0–0.4)
EOSINOPHIL NFR BLD AUTO: 1.3 % (ref 0.3–6.2)
EPAP: 0
ERYTHROCYTE [DISTWIDTH] IN BLOOD BY AUTOMATED COUNT: 12.7 % (ref 12.3–15.4)
GFR SERPL CREATININE-BSD FRML MDRD: 69 ML/MIN/1.73
GLUCOSE SERPL-MCNC: 83 MG/DL (ref 65–99)
HBA1C MFR BLD: 5.4 % (ref 4.8–5.6)
HCO3 BLDA-SCNC: 24.9 MMOL/L (ref 20–26)
HCO3 BLDA-SCNC: 26.2 MMOL/L (ref 20–26)
HCO3 BLDA-SCNC: 26.3 MMOL/L (ref 20–26)
HCO3 BLDA-SCNC: 26.4 MMOL/L (ref 20–26)
HCO3 BLDA-SCNC: 26.7 MMOL/L (ref 20–26)
HCT VFR BLD AUTO: 41.1 % (ref 34–46.6)
HCT VFR BLD CALC: 36.9 % (ref 38–51)
HCT VFR BLD CALC: 37 % (ref 38–51)
HCT VFR BLD CALC: 37.3 % (ref 38–51)
HCT VFR BLD CALC: 37.5 % (ref 38–51)
HCT VFR BLD CALC: 37.5 % (ref 38–51)
HDLC SERPL-MCNC: 83 MG/DL (ref 40–60)
HGB BLD-MCNC: 13.5 G/DL (ref 12–15.9)
HGB BLDA-MCNC: 12 G/DL (ref 14–18)
HGB BLDA-MCNC: 12.1 G/DL (ref 14–18)
HGB BLDA-MCNC: 12.2 G/DL (ref 14–18)
IMM GRANULOCYTES # BLD AUTO: 0.02 10*3/MM3 (ref 0–0.05)
IMM GRANULOCYTES NFR BLD AUTO: 0.4 % (ref 0–0.5)
INHALED O2 CONCENTRATION: 21 %
INR PPP: 0.97 (ref 0.85–1.16)
IPAP: 0
LDLC SERPL CALC-MCNC: 150 MG/DL (ref 0–100)
LDLC/HDLC SERPL: 1.79 {RATIO}
LYMPHOCYTES # BLD AUTO: 2.15 10*3/MM3 (ref 0.7–3.1)
LYMPHOCYTES NFR BLD AUTO: 38.4 % (ref 19.6–45.3)
MAGNESIUM SERPL-MCNC: 2.6 MG/DL (ref 1.6–2.6)
MCH RBC QN AUTO: 29.5 PG (ref 26.6–33)
MCHC RBC AUTO-ENTMCNC: 32.8 G/DL (ref 31.5–35.7)
MCV RBC AUTO: 89.7 FL (ref 79–97)
METHGB BLD QL: 0.5 % (ref 0–1.5)
MODALITY: ABNORMAL
MONOCYTES # BLD AUTO: 0.33 10*3/MM3 (ref 0.1–0.9)
MONOCYTES NFR BLD AUTO: 5.9 % (ref 5–12)
NEUTROPHILS NFR BLD AUTO: 3 10*3/MM3 (ref 1.7–7)
NEUTROPHILS NFR BLD AUTO: 53.5 % (ref 42.7–76)
NOTE: ABNORMAL
NRBC BLD AUTO-RTO: 0 /100 WBC (ref 0–0.2)
OXYHGB MFR BLDV: 68.8 % (ref 94–99)
OXYHGB MFR BLDV: 69.6 % (ref 94–99)
OXYHGB MFR BLDV: 69.9 % (ref 94–99)
OXYHGB MFR BLDV: 69.9 % (ref 94–99)
OXYHGB MFR BLDV: 93.2 % (ref 94–99)
PAW @ PEAK INSP FLOW SETTING VENT: 0 CMH2O
PCO2 BLDA: 46.3 MM HG (ref 35–45)
PCO2 BLDA: 49 MM HG (ref 35–45)
PCO2 BLDA: 50.1 MM HG (ref 35–45)
PCO2 BLDA: 50.4 MM HG (ref 35–45)
PCO2 BLDA: 51 MM HG (ref 35–45)
PCO2 TEMP ADJ BLD: 46.3 MM HG (ref 35–45)
PCO2 TEMP ADJ BLD: 49 MM HG (ref 35–45)
PCO2 TEMP ADJ BLD: 50.1 MM HG (ref 35–45)
PCO2 TEMP ADJ BLD: 50.4 MM HG (ref 35–45)
PCO2 TEMP ADJ BLD: 51 MM HG (ref 35–45)
PH BLDA: 7.33 PH UNITS (ref 7.35–7.45)
PH BLDA: 7.34 PH UNITS (ref 7.35–7.45)
PH BLDA: 7.34 PH UNITS (ref 7.35–7.45)
PH, TEMP CORRECTED: 7.33 PH UNITS
PH, TEMP CORRECTED: 7.34 PH UNITS
PH, TEMP CORRECTED: 7.34 PH UNITS
PLATELET # BLD AUTO: 313 10*3/MM3 (ref 140–450)
PMV BLD AUTO: 10.1 FL (ref 6–12)
PO2 BLDA: 39.1 MM HG (ref 83–108)
PO2 BLDA: 39.5 MM HG (ref 83–108)
PO2 BLDA: 39.7 MM HG (ref 83–108)
PO2 BLDA: 39.8 MM HG (ref 83–108)
PO2 BLDA: 75 MM HG (ref 83–108)
PO2 TEMP ADJ BLD: 39.1 MM HG (ref 83–108)
PO2 TEMP ADJ BLD: 39.5 MM HG (ref 83–108)
PO2 TEMP ADJ BLD: 39.7 MM HG (ref 83–108)
PO2 TEMP ADJ BLD: 39.8 MM HG (ref 83–108)
PO2 TEMP ADJ BLD: 75 MM HG (ref 83–108)
POTASSIUM SERPL-SCNC: 3.5 MMOL/L (ref 3.5–5.2)
PROTHROMBIN TIME: 12.6 SECONDS (ref 11.4–14.4)
RBC # BLD AUTO: 4.58 10*6/MM3 (ref 3.77–5.28)
SODIUM SERPL-SCNC: 140 MMOL/L (ref 136–145)
TOTAL RATE: 0 BREATHS/MINUTE
TRIGL SERPL-MCNC: 47 MG/DL (ref 0–150)
VLDLC SERPL-MCNC: 8 MG/DL (ref 5–40)
WBC # BLD AUTO: 5.6 10*3/MM3 (ref 3.4–10.8)

## 2021-11-02 PROCEDURE — 83050 HGB METHEMOGLOBIN QUAN: CPT

## 2021-11-02 PROCEDURE — C1894 INTRO/SHEATH, NON-LASER: HCPCS | Performed by: INTERNAL MEDICINE

## 2021-11-02 PROCEDURE — 83735 ASSAY OF MAGNESIUM: CPT | Performed by: NURSE PRACTITIONER

## 2021-11-02 PROCEDURE — 0 IOPAMIDOL PER 1 ML: Performed by: INTERNAL MEDICINE

## 2021-11-02 PROCEDURE — 80048 BASIC METABOLIC PNL TOTAL CA: CPT | Performed by: NURSE PRACTITIONER

## 2021-11-02 PROCEDURE — 93451 RIGHT HEART CATH: CPT | Performed by: INTERNAL MEDICINE

## 2021-11-02 PROCEDURE — 80061 LIPID PANEL: CPT | Performed by: NURSE PRACTITIONER

## 2021-11-02 PROCEDURE — 75710 ARTERY X-RAYS ARM/LEG: CPT | Performed by: INTERNAL MEDICINE

## 2021-11-02 PROCEDURE — 82375 ASSAY CARBOXYHB QUANT: CPT

## 2021-11-02 PROCEDURE — C1751 CATH, INF, PER/CENT/MIDLINE: HCPCS | Performed by: INTERNAL MEDICINE

## 2021-11-02 PROCEDURE — 82805 BLOOD GASES W/O2 SATURATION: CPT

## 2021-11-02 PROCEDURE — 36600 WITHDRAWAL OF ARTERIAL BLOOD: CPT

## 2021-11-02 PROCEDURE — 85610 PROTHROMBIN TIME: CPT | Performed by: NURSE PRACTITIONER

## 2021-11-02 PROCEDURE — 25010000002 FENTANYL CITRATE (PF) 50 MCG/ML SOLUTION: Performed by: INTERNAL MEDICINE

## 2021-11-02 PROCEDURE — 82565 ASSAY OF CREATININE: CPT

## 2021-11-02 PROCEDURE — 85025 COMPLETE CBC W/AUTO DIFF WBC: CPT | Performed by: NURSE PRACTITIONER

## 2021-11-02 PROCEDURE — 25010000002 MIDAZOLAM PER 1 MG: Performed by: INTERNAL MEDICINE

## 2021-11-02 PROCEDURE — 83036 HEMOGLOBIN GLYCOSYLATED A1C: CPT | Performed by: NURSE PRACTITIONER

## 2021-11-02 RX ORDER — HYDROCODONE BITARTRATE AND ACETAMINOPHEN 7.5; 325 MG/1; MG/1
1 TABLET ORAL EVERY 4 HOURS PRN
Status: DISCONTINUED | OUTPATIENT
Start: 2021-11-02 | End: 2021-11-02 | Stop reason: HOSPADM

## 2021-11-02 RX ORDER — MORPHINE SULFATE 2 MG/ML
1 INJECTION, SOLUTION INTRAMUSCULAR; INTRAVENOUS EVERY 4 HOURS PRN
Status: DISCONTINUED | OUTPATIENT
Start: 2021-11-02 | End: 2021-11-02 | Stop reason: HOSPADM

## 2021-11-02 RX ORDER — MIDAZOLAM HYDROCHLORIDE 1 MG/ML
INJECTION INTRAMUSCULAR; INTRAVENOUS AS NEEDED
Status: DISCONTINUED | OUTPATIENT
Start: 2021-11-02 | End: 2021-11-02 | Stop reason: HOSPADM

## 2021-11-02 RX ORDER — SODIUM CHLORIDE 9 MG/ML
250 INJECTION, SOLUTION INTRAVENOUS ONCE AS NEEDED
Status: DISCONTINUED | OUTPATIENT
Start: 2021-11-02 | End: 2021-11-02 | Stop reason: HOSPADM

## 2021-11-02 RX ORDER — FENTANYL CITRATE 50 UG/ML
INJECTION, SOLUTION INTRAMUSCULAR; INTRAVENOUS AS NEEDED
Status: DISCONTINUED | OUTPATIENT
Start: 2021-11-02 | End: 2021-11-02 | Stop reason: HOSPADM

## 2021-11-02 RX ORDER — LIDOCAINE HYDROCHLORIDE 10 MG/ML
INJECTION, SOLUTION EPIDURAL; INFILTRATION; INTRACAUDAL; PERINEURAL AS NEEDED
Status: DISCONTINUED | OUTPATIENT
Start: 2021-11-02 | End: 2021-11-02 | Stop reason: HOSPADM

## 2021-11-02 RX ORDER — ACETAMINOPHEN 325 MG/1
650 TABLET ORAL EVERY 4 HOURS PRN
Status: DISCONTINUED | OUTPATIENT
Start: 2021-11-02 | End: 2021-11-02 | Stop reason: HOSPADM

## 2021-11-02 RX ORDER — SODIUM CHLORIDE 0.9 % (FLUSH) 0.9 %
3 SYRINGE (ML) INJECTION EVERY 12 HOURS SCHEDULED
Status: DISCONTINUED | OUTPATIENT
Start: 2021-11-02 | End: 2021-11-02 | Stop reason: HOSPADM

## 2021-11-02 RX ORDER — SODIUM CHLORIDE 0.9 % (FLUSH) 0.9 %
3-10 SYRINGE (ML) INJECTION AS NEEDED
Status: DISCONTINUED | OUTPATIENT
Start: 2021-11-02 | End: 2021-11-02 | Stop reason: HOSPADM

## 2021-11-02 RX ORDER — NALOXONE HCL 0.4 MG/ML
0.4 VIAL (ML) INJECTION
Status: DISCONTINUED | OUTPATIENT
Start: 2021-11-02 | End: 2021-11-02 | Stop reason: HOSPADM

## 2021-11-02 RX ORDER — SODIUM CHLORIDE 9 MG/ML
100 INJECTION, SOLUTION INTRAVENOUS CONTINUOUS
Status: ACTIVE | OUTPATIENT
Start: 2021-11-02 | End: 2021-11-02

## 2021-11-02 RX ORDER — SODIUM CHLORIDE 9 MG/ML
3 INJECTION, SOLUTION INTRAVENOUS CONTINUOUS
Status: ACTIVE | OUTPATIENT
Start: 2021-11-02 | End: 2021-11-02

## 2021-11-02 RX ORDER — ALPRAZOLAM 0.25 MG/1
0.25 TABLET ORAL 3 TIMES DAILY PRN
Status: DISCONTINUED | OUTPATIENT
Start: 2021-11-02 | End: 2021-11-02 | Stop reason: HOSPADM

## 2021-11-02 RX ADMIN — SODIUM CHLORIDE 3 ML/KG/HR: 9 INJECTION, SOLUTION INTRAVENOUS at 13:18

## 2021-11-02 RX ADMIN — ACETAMINOPHEN 650 MG: 325 TABLET, FILM COATED ORAL at 18:00

## 2021-11-02 NOTE — INTERVAL H&P NOTE
Pre-cardiac Catheterization Report  Cardiovascular Laboratory  [facility]    Patient:  Joanie Higuera  :  1965  PCP:  Eren Brock MD  PHONE:  [phone]    DATE: 2021    Chief Complaint: shortness of breath      Stress test within last 6 months:   No    Previous cardiac catheterization:    Cardiac catheterization 2016: Complete occlusion of LIMA to left SVC fistula following coiling. NL CO and CI    CTA chest, 2021: Previous embolization coils and some radiodense material in the left lung as on multiple prior radiographs. No new pulmonary parenchymal disease. No evidence of acute pulmonary embolic disease. Multiple hepatic cysts again incidentally noted    Echocardiogram, 10/20/2021: EF 55%. Trace MR. Trace to mild TR    Event monitor, 2021: SB/SR/ST/PVCs. Average HR: 80, min 51, max 134    Allergies:       No Known Allergies    MEDICATIONS:  Prior to Admission medications    Medication Sig Start Date End Date Taking? Authorizing Provider   buPROPion XL (WELLBUTRIN XL) 300 MG 24 hr tablet TAKE 1 TABLET BY MOUTH EVERY DAY 10/11/21   Eren Brock MD   cetirizine (ZyrTEC) 10 MG tablet Take 10 mg by mouth at night as needed for allergies.    Carla Coy MD   gabapentin (NEURONTIN) 300 MG capsule Take 1 capsule by mouth 3 (Three) Times a Day. 21   Eren Brock MD   LORazepam (ATIVAN) 0.5 MG tablet Take 1 tablet by mouth Every 6 (Six) Hours As Needed for Anxiety. 21   Eren Brock MD   Magnesium Oxide 400 MG capsule Take 400 mg by mouth As Needed. 12   Carla Coy MD   meloxicam (MOBIC) 15 MG tablet TAKE 1 TABLET BY MOUTH EVERY DAY 21   Eren Brock MD   modafinil (PROVIGIL) 200 MG tablet Take 1 tablet by mouth Daily.  Patient taking differently: Take 200 mg by mouth As Needed. 21   Eren Brock MD   Multiple Vitamins-Minerals (MULTIVITAMIN ADULT PO) Take 1 tablet by mouth every morning.    Carla Coy  MD   pramipexole (MIRAPEX) 0.125 MG tablet TAKE 1 TO 2 TABLETS BY MOUTH EVERY EVENING 10/11/21   Eren Brock MD   topiramate (TOPAMAX) 50 MG tablet TAKE 1 TABLET BY MOUTH AT BEDTIME 7/6/21   Eren Brock MD   vitamin D (ERGOCALCIFEROL) 1.25 MG (16686 UT) capsule capsule Take 1 capsule by mouth 1 (One) Time Per Week. 12/27/19   Eren Brock MD       Past medical & surgical history, social and family history reviewed in the electronic medical record.    Physical Exam:    Vitals: There were no vitals filed for this visit. There were no vitals filed for this visit.There is no height or weight on file to calculate BMI.  Bp 119/84, RR 14, HR 89bpm, O2 sat 99% on RA  GENERAL: No apparent distress.  No significant changes since last exam.  CHEST: Clear to auscultation bilaterally no stridor no wheeze.  CV: S1, S2, Regular without Murmurs, Rubs or Gallops  EXTREMITIES: No edema.                  Lab Results   Component Value Date    CHLPL 169 10/19/2015    TRIG 58 04/29/2019    HDL 94 04/29/2019    AST 16 12/17/2019    ALT 13 12/17/2019           IMPRESSION:  Patient with continued shortness of breath with activity presents to [facility]/02/2021 for scheduled right and left heart catheterization +/-CBI. She has not had any more syncopal episodes since she was seen by Dr. Mata in office. She denies chest pain, palpitations, or lower extremity edema. She has a history of left subclavian artery/brachiocephalic vein AV fistula and reports her symptoms are similar to what she experienced prior to her last coiling. Heart catheterization procedures, risks, and complications discussed with the patient and she is agreeable to proceed. No new labs yet to review.    PLAN:  · Procedure to perform: Right and left heart catheterization +/-CBI  · Follow up with neurology for possible seizures    Electronically signed by JACKELIN Stanley, 11/02/21, 12:28 PM EDT.     Holley Mata MD, FACC

## 2021-11-05 DIAGNOSIS — R55 SYNCOPE AND COLLAPSE: Primary | ICD-10-CM

## 2021-11-05 DIAGNOSIS — R42 DIZZINESS: ICD-10-CM

## 2021-11-19 ENCOUNTER — OFFICE VISIT (OUTPATIENT)
Dept: FAMILY MEDICINE CLINIC | Facility: CLINIC | Age: 56
End: 2021-11-19

## 2021-11-19 VITALS
OXYGEN SATURATION: 99 % | HEIGHT: 69 IN | DIASTOLIC BLOOD PRESSURE: 50 MMHG | TEMPERATURE: 96.9 F | BODY MASS INDEX: 24.11 KG/M2 | RESPIRATION RATE: 18 BRPM | WEIGHT: 162.8 LBS | HEART RATE: 88 BPM | SYSTOLIC BLOOD PRESSURE: 122 MMHG

## 2021-11-19 DIAGNOSIS — F41.9 ANXIETY: ICD-10-CM

## 2021-11-19 DIAGNOSIS — F43.0 ACUTE REACTION TO STRESS: ICD-10-CM

## 2021-11-19 DIAGNOSIS — I77.0 A-V FISTULA (HCC): ICD-10-CM

## 2021-11-19 DIAGNOSIS — U07.1 COVID-19 VIRUS INFECTION: ICD-10-CM

## 2021-11-19 DIAGNOSIS — F43.23 ACUTE ADJUSTMENT DISORDER WITH MIXED ANXIETY AND DEPRESSED MOOD: ICD-10-CM

## 2021-11-19 DIAGNOSIS — R42 DIZZINESS: Primary | ICD-10-CM

## 2021-11-19 DIAGNOSIS — G44.89 OTHER HEADACHE SYNDROME: ICD-10-CM

## 2021-11-19 PROBLEM — F40.243 FEAR OF FLYING: Status: ACTIVE | Noted: 2021-11-19

## 2021-11-19 PROCEDURE — 99213 OFFICE O/P EST LOW 20 MIN: CPT | Performed by: FAMILY MEDICINE

## 2021-12-02 DIAGNOSIS — G25.81 RESTLESS LEGS SYNDROME (RLS): ICD-10-CM

## 2021-12-02 DIAGNOSIS — G43.809 OTHER MIGRAINE WITHOUT STATUS MIGRAINOSUS, NOT INTRACTABLE: ICD-10-CM

## 2021-12-03 ENCOUNTER — OFFICE VISIT (OUTPATIENT)
Dept: NEUROLOGY | Facility: CLINIC | Age: 56
End: 2021-12-03

## 2021-12-03 VITALS
OXYGEN SATURATION: 99 % | HEIGHT: 69 IN | SYSTOLIC BLOOD PRESSURE: 126 MMHG | TEMPERATURE: 97.5 F | HEART RATE: 84 BPM | WEIGHT: 167.2 LBS | DIASTOLIC BLOOD PRESSURE: 98 MMHG | BODY MASS INDEX: 24.76 KG/M2 | RESPIRATION RATE: 18 BRPM

## 2021-12-03 DIAGNOSIS — E78.2 MIXED HYPERLIPIDEMIA: ICD-10-CM

## 2021-12-03 DIAGNOSIS — G25.81 RLS (RESTLESS LEGS SYNDROME): ICD-10-CM

## 2021-12-03 DIAGNOSIS — R55 SYNCOPE, UNSPECIFIED SYNCOPE TYPE: Primary | ICD-10-CM

## 2021-12-03 DIAGNOSIS — I77.0 A-V FISTULA (HCC): ICD-10-CM

## 2021-12-03 DIAGNOSIS — U07.1 COVID-19 VIRUS INFECTION: ICD-10-CM

## 2021-12-03 DIAGNOSIS — G43.909 MIGRAINE WITHOUT STATUS MIGRAINOSUS, NOT INTRACTABLE, UNSPECIFIED MIGRAINE TYPE: ICD-10-CM

## 2021-12-03 DIAGNOSIS — R40.4 TRANSIENT ALTERATION OF AWARENESS: ICD-10-CM

## 2021-12-03 DIAGNOSIS — R55 SYNCOPE, CARDIOGENIC: ICD-10-CM

## 2021-12-03 DIAGNOSIS — R53.83 OTHER FATIGUE: ICD-10-CM

## 2021-12-03 PROCEDURE — 99214 OFFICE O/P EST MOD 30 MIN: CPT | Performed by: NURSE PRACTITIONER

## 2021-12-03 RX ORDER — GABAPENTIN 300 MG/1
300 CAPSULE ORAL 3 TIMES DAILY
Qty: 90 CAPSULE | Refills: 2 | Status: SHIPPED | OUTPATIENT
Start: 2021-12-03 | End: 2022-05-18

## 2021-12-10 ENCOUNTER — TELEPHONE (OUTPATIENT)
Dept: FAMILY MEDICINE CLINIC | Facility: CLINIC | Age: 56
End: 2021-12-10

## 2021-12-10 NOTE — TELEPHONE ENCOUNTER
----- Message from Joanie Higuera sent at 12/9/2021  9:07 AM EST -----  Regarding: Visit Follow-Up Question  Contact: [phone]  Just wanted to see - now that deductibles have been met - what is possibility of getting the imaging on the thyroid nodule before the end of December?    Thanks!

## 2021-12-13 DIAGNOSIS — E04.1 THYROID NODULE: Primary | ICD-10-CM

## 2021-12-20 ENCOUNTER — APPOINTMENT (OUTPATIENT)
Dept: ULTRASOUND IMAGING | Facility: HOSPITAL | Age: 56
End: 2021-12-20

## 2021-12-22 DIAGNOSIS — G43.809 OTHER MIGRAINE WITHOUT STATUS MIGRAINOSUS, NOT INTRACTABLE: ICD-10-CM

## 2021-12-22 DIAGNOSIS — R06.02 SOB (SHORTNESS OF BREATH): ICD-10-CM

## 2021-12-22 DIAGNOSIS — G25.81 RESTLESS LEGS SYNDROME (RLS): ICD-10-CM

## 2021-12-22 DIAGNOSIS — E55.9 VITAMIN D DEFICIENCY: ICD-10-CM

## 2021-12-22 DIAGNOSIS — F43.0 ACUTE REACTION TO STRESS: ICD-10-CM

## 2021-12-23 RX ORDER — TOPIRAMATE 50 MG/1
TABLET, FILM COATED ORAL
Qty: 90 TABLET | Refills: 0 | Status: SHIPPED | OUTPATIENT
Start: 2021-12-23 | End: 2022-03-30

## 2021-12-23 RX ORDER — LORAZEPAM 0.5 MG/1
0.5 TABLET ORAL EVERY 6 HOURS PRN
Qty: 15 TABLET | Refills: 1 | Status: SHIPPED | OUTPATIENT
Start: 2021-12-23 | End: 2022-09-23

## 2021-12-23 RX ORDER — MELOXICAM 15 MG/1
TABLET ORAL
Qty: 90 TABLET | Refills: 0 | Status: SHIPPED | OUTPATIENT
Start: 2021-12-23 | End: 2022-03-30

## 2021-12-23 RX ORDER — MODAFINIL 100 MG/1
TABLET ORAL
Qty: 90 TABLET | Refills: 0 | Status: SHIPPED | OUTPATIENT
Start: 2021-12-23 | End: 2022-05-18

## 2021-12-23 RX ORDER — BUPROPION HYDROCHLORIDE 300 MG/1
300 TABLET ORAL DAILY
Qty: 90 TABLET | Refills: 0 | Status: SHIPPED | OUTPATIENT
Start: 2021-12-23 | End: 2022-03-30

## 2021-12-23 RX ORDER — MELOXICAM 15 MG/1
15 TABLET ORAL DAILY
Qty: 90 TABLET | Refills: 0 | Status: SHIPPED | OUTPATIENT
Start: 2021-12-23 | End: 2022-08-26

## 2021-12-23 RX ORDER — MODAFINIL 200 MG/1
200 TABLET ORAL DAILY
Qty: 30 TABLET | Refills: 2 | Status: SHIPPED | OUTPATIENT
Start: 2021-12-23 | End: 2021-12-27 | Stop reason: SDUPTHER

## 2021-12-23 RX ORDER — PRAMIPEXOLE DIHYDROCHLORIDE 0.12 MG/1
TABLET ORAL
Qty: 180 TABLET | Refills: 3 | Status: SHIPPED | OUTPATIENT
Start: 2021-12-23 | End: 2023-03-20 | Stop reason: SDUPTHER

## 2021-12-23 RX ORDER — TOPIRAMATE 50 MG/1
50 TABLET, FILM COATED ORAL
Qty: 90 TABLET | Refills: 0 | Status: SHIPPED | OUTPATIENT
Start: 2021-12-23 | End: 2023-03-20 | Stop reason: SDUPTHER

## 2021-12-23 RX ORDER — ERGOCALCIFEROL 1.25 MG/1
50000 CAPSULE ORAL WEEKLY
Qty: 6 CAPSULE | Refills: 0 | Status: SHIPPED | OUTPATIENT
Start: 2021-12-23 | End: 2023-03-29

## 2021-12-27 DIAGNOSIS — R06.02 SOB (SHORTNESS OF BREATH): ICD-10-CM

## 2021-12-27 RX ORDER — MODAFINIL 200 MG/1
200 TABLET ORAL DAILY
Qty: 30 TABLET | Refills: 2 | Status: SHIPPED | OUTPATIENT
Start: 2021-12-27 | End: 2022-05-18 | Stop reason: SDUPTHER

## 2022-01-04 ENCOUNTER — APPOINTMENT (OUTPATIENT)
Dept: MRI IMAGING | Facility: HOSPITAL | Age: 57
End: 2022-01-04

## 2022-01-14 ENCOUNTER — APPOINTMENT (OUTPATIENT)
Dept: CARDIOLOGY | Facility: HOSPITAL | Age: 57
End: 2022-01-14

## 2022-01-14 ENCOUNTER — APPOINTMENT (OUTPATIENT)
Dept: NEUROLOGY | Facility: HOSPITAL | Age: 57
End: 2022-01-14

## 2022-01-19 DIAGNOSIS — R06.02 SOB (SHORTNESS OF BREATH): ICD-10-CM

## 2022-01-19 RX ORDER — MODAFINIL 200 MG/1
200 TABLET ORAL DAILY
Qty: 90 TABLET | Refills: 0 | Status: CANCELLED | OUTPATIENT
Start: 2022-01-19

## 2022-03-26 DIAGNOSIS — G43.809 OTHER MIGRAINE WITHOUT STATUS MIGRAINOSUS, NOT INTRACTABLE: ICD-10-CM

## 2022-03-26 DIAGNOSIS — G25.81 RESTLESS LEGS SYNDROME (RLS): ICD-10-CM

## 2022-03-26 DIAGNOSIS — F43.0 ACUTE REACTION TO STRESS: ICD-10-CM

## 2022-03-29 DIAGNOSIS — R06.02 SOB (SHORTNESS OF BREATH): ICD-10-CM

## 2022-03-30 RX ORDER — TOPIRAMATE 50 MG/1
TABLET, FILM COATED ORAL
Qty: 90 TABLET | Refills: 0 | Status: SHIPPED | OUTPATIENT
Start: 2022-03-30 | End: 2022-05-18 | Stop reason: SDUPTHER

## 2022-03-30 RX ORDER — BUPROPION HYDROCHLORIDE 300 MG/1
TABLET ORAL
Qty: 90 TABLET | Refills: 0 | Status: SHIPPED | OUTPATIENT
Start: 2022-03-30 | End: 2022-08-08

## 2022-03-30 RX ORDER — MELOXICAM 15 MG/1
TABLET ORAL
Qty: 90 TABLET | Refills: 0 | Status: SHIPPED | OUTPATIENT
Start: 2022-03-30 | End: 2022-05-18 | Stop reason: SDUPTHER

## 2022-04-05 RX ORDER — MODAFINIL 100 MG/1
TABLET ORAL
Qty: 90 TABLET | Refills: 0 | OUTPATIENT
Start: 2022-04-05

## 2022-05-17 NOTE — PROGRESS NOTES
Subjective   Joanie Higuera is a 56 y.o. female.     Chief Complaint   Patient presents with   • Headache   • Anxiety   • Dizziness   • Fatigue       Headaches:   Patient states that's he is getting tension headaches still but she states that this is not abnormal for her as she has had neck surgery. She states that there are days that everything hurts her and she states that some days are better than normal for her.      Anxiety  Presents for follow-up (patient is on wellbutrin she states that she feels she is fine on it but also has her days. ) visit. Symptoms include decreased concentration, dizziness, nausea and shortness of breath. Patient reports no chest pain, compulsions, confusion, depressed mood, dry mouth, excessive worry, feeling of choking, hyperventilation, impotence, insomnia, irritability, malaise, muscle tension, nervous/anxious behavior, obsessions, palpitations, panic, restlessness or suicidal ideas. Symptoms occur most days. The severity of symptoms is mild and interfering with daily activities. The quality of sleep is fair. Nighttime awakenings: occasional.     Compliance with medications is %.   Dizziness  This is a new problem. The current episode started 1 to 4 weeks ago. The problem occurs intermittently. Associated symptoms include fatigue, headaches, nausea and neck pain. Pertinent negatives include no abdominal pain, anorexia, arthralgias, change in bowel habit, chest pain, chills, congestion, coughing, diaphoresis, fever, joint swelling, myalgias, numbness, rash, sore throat, swollen glands, urinary symptoms, vertigo, visual change, vomiting or weakness. Associated symptoms comments: Patient states that this has improved. She states that she is not up and down and to where she is seeing this to be an issues. . Nothing aggravates the symptoms. She has tried nothing for the symptoms.   Fatigue  This is a recurrent (since covid ) problem. The current episode started more than 1  month ago. The problem occurs daily. The problem has been gradually worsening. Associated symptoms include fatigue, headaches, nausea and neck pain. Pertinent negatives include no abdominal pain, anorexia, arthralgias, change in bowel habit, chest pain, chills, congestion, coughing, diaphoresis, fever, joint swelling, myalgias, numbness, rash, sore throat, swollen glands, urinary symptoms, vertigo, visual change, vomiting or weakness. Associated symptoms comments: Patient states that since she has had covid she has not been 100% and she states that it had really done a number on her. She states that she used to be a very outgoing person and was very outdoors and she states that she really just does not have the want or the energy to do so. . The symptoms are aggravated by exertion and walking.            I personally reviewed and updated the patient's allergies, medications, problem list, and past medical, surgical, social, and family history. I have reviewed and confirmed the accuracy of the History of Present Illness and Review of Symptoms as documented by the MA/LPN/RN. Eren Brock MD    Family History   Problem Relation Age of Onset   • Arrhythmia Mother    • Colon cancer Mother    • Other Mother    • Stroke Father    • Diabetes Father    • Parkinsonism Father    • Seizures Father    • Heart disease Father        Social History     Tobacco Use   • Smoking status: Never Smoker   • Smokeless tobacco: Never Used   Vaping Use   • Vaping Use: Never used   Substance Use Topics   • Alcohol use: Yes     Alcohol/week: 5.0 standard drinks     Types: 3 Glasses of wine, 2 Standard drinks or equivalent per week     Comment: social   • Drug use: No       Past Surgical History:   Procedure Laterality Date   • ARTERIOVENOUS FISTULA REPAIR  2015    ENDO REPAIR BY DR KAPLAN   • CARDIAC CATHETERIZATION     • CARDIAC CATHETERIZATION  08/05/2016    RIGHT HEART CATH PER DR. COLEMAN   • CARDIAC CATHETERIZATION N/A 8/5/2016     Procedure: Right Heart Cath;  Surgeon: Holley Mata MD;  Location:  [facility];  Service:    • CARDIAC CATHETERIZATION Bilateral 11/2/2021    Procedure: Right and Left Heart Cath;  Surgeon: Holley Mata MD;  Location:  [facility];  Service: Cardiology;  Laterality: Bilateral;   • CARDIAC PACEMAKER REMOVAL  2012    FOR STAPH INFECTION   • FOOT SURGERY Right    • HYSTERECTOMY      ovaries remain   • PACEMAKER IMPLANTATION  1991 - ORIGINAL   • THYROID LOBECTOMY Left        Patient Active Problem List   Diagnosis   • SOB (shortness of breath)   • A-V fistula (HCC)   • Bursitis of hip   • PONV (postoperative nausea and vomiting)   • Ischemic colitis (HCC)   • Syncope, cardiogenic   • MRSA infection   • Thyroid nodule   • Arthritis   • Dizziness   • Migraine without status migrainosus, not intractable   • Restless legs syndrome (RLS)   • Menopause   • Pacemaker infection (HCC)   • Palpitations   • Acute reaction to stress   • Tachycardia   • Allergic rhinitis   • Asthma   • GERD (gastroesophageal reflux disease)   • Heart murmur   • Mixed hyperlipidemia   • Hypotension   • RLS (restless legs syndrome)   • Acute adjustment disorder with mixed anxiety and depressed mood   • Shift work sleep disorder   • Pacemaker   • COVID-19 virus infection   • Annual visit for general adult medical examination with abnormal findings   • Encounter for screening mammogram for malignant neoplasm of breast   • Screening for malignant neoplasm of colon   • Other headache syndrome   • Anxiety   • Fear of flying         Current Outpatient Medications:   •  buPROPion XL (WELLBUTRIN XL) 300 MG 24 hr tablet, TAKE 1 TABLET BY MOUTH EVERY DAY, Disp: 90 tablet, Rfl: 0  •  cetirizine (ZyrTEC) 10 MG tablet, Take 10 mg by mouth at night as needed for allergies., Disp: , Rfl:   •  LORazepam (ATIVAN) 0.5 MG tablet, Take 1 tablet by mouth Every 6 (Six) Hours As Needed for Anxiety., Disp: 15 tablet,  Rfl: 1  •  Magnesium Oxide 400 MG capsule, Take 400 mg by mouth As Needed., Disp: , Rfl:   •  meloxicam (MOBIC) 15 MG tablet, Take 1 tablet by mouth Daily., Disp: 90 tablet, Rfl: 0  •  Multiple Vitamins-Minerals (MULTIVITAMIN ADULT PO), Take 1 tablet by mouth every morning., Disp: , Rfl:   •  pramipexole (MIRAPEX) 0.125 MG tablet, Take 1 to 2 tablets nightly as needed, Disp: 180 tablet, Rfl: 3  •  topiramate (TOPAMAX) 50 MG tablet, Take 1 tablet by mouth every night at bedtime., Disp: 90 tablet, Rfl: 0  •  vitamin D (ERGOCALCIFEROL) 1.25 MG (49772 UT) capsule capsule, Take 1 capsule by mouth 1 (One) Time Per Week., Disp: 6 capsule, Rfl: 0  •  gabapentin (NEURONTIN) 300 MG capsule, TAKE 1 CAPSULE BY MOUTH THREE TIMES A DAY, Disp: 270 capsule, Rfl: 0  •  modafinil (PROVIGIL) 200 MG tablet, Take 1 tablet by mouth Daily., Disp: 90 tablet, Rfl: 0         Review of Systems   Constitutional: Positive for fatigue. Negative for chills, diaphoresis, fever and irritability.   HENT: Negative for congestion, sore throat and swollen glands.    Eyes: Negative for visual disturbance.   Respiratory: Positive for shortness of breath. Negative for cough.    Cardiovascular: Negative for chest pain and palpitations.   Gastrointestinal: Positive for nausea. Negative for abdominal pain, anorexia, change in bowel habit and vomiting.   Endocrine: Negative for polydipsia and polyphagia.   Genitourinary: Negative for impotence.   Musculoskeletal: Positive for neck pain. Negative for arthralgias, joint swelling, myalgias and neck stiffness.   Skin: Negative for color change, pallor and rash.   Neurological: Positive for dizziness. Negative for vertigo, seizures, syncope, weakness, numbness and confusion.   Hematological: Negative for adenopathy.   Psychiatric/Behavioral: Positive for decreased concentration. Negative for hallucinations, suicidal ideas and depressed mood. The patient is not nervous/anxious and does not have insomnia.        I  "have reviewed and confirmed the accuracy of the ROS as documented by the MA/LPN/RN Eren Brock MD      Objective   /66   Pulse 93   Temp 97.5 °F (36.4 °C)   Resp 18   Ht 175.3 cm (69\")   Wt 75.1 kg (165 lb 9.6 oz)   SpO2 100%   BMI 24.45 kg/m²   BP Readings from Last 3 Encounters:   05/18/22 130/66   12/03/21 126/98   11/19/21 122/50     Wt Readings from Last 3 Encounters:   05/18/22 75.1 kg (165 lb 9.6 oz)   12/03/21 75.8 kg (167 lb 3.2 oz)   11/19/21 73.8 kg (162 lb 12.8 oz)     Physical Exam  Constitutional:       Appearance: Normal appearance. She is well-developed. She is not diaphoretic.   Cardiovascular:      Rate and Rhythm: Normal rate and regular rhythm.      Pulses: Normal pulses.      Heart sounds: Normal heart sounds, S1 normal and S2 normal. No murmur heard.    No friction rub. No gallop.   Pulmonary:      Effort: Pulmonary effort is normal. No accessory muscle usage.      Breath sounds: Normal breath sounds. No stridor. No decreased breath sounds, wheezing, rhonchi or rales.   Abdominal:      General: Bowel sounds are normal. There is no distension.      Palpations: Abdomen is soft. Abdomen is not rigid. There is no mass or pulsatile mass.      Tenderness: There is no abdominal tenderness. There is no guarding or rebound. Negative signs include Wild's sign.      Hernia: No hernia is present.   Skin:     General: Skin is warm and dry.      Coloration: Skin is not pale.   Neurological:      Mental Status: She is alert and oriented to person, place, and time.      Cranial Nerves: No cranial nerve deficit.      Sensory: No sensory deficit.      Motor: No tremor, abnormal muscle tone or seizure activity.      Coordination: Coordination normal.      Gait: Gait normal.      Deep Tendon Reflexes: Reflexes are normal and symmetric.         Data / Lab Results:    Hemoglobin A1C   Date Value Ref Range Status   11/02/2021 5.40 4.80 - 5.60 % Final   08/04/2016 5.20 4.00 - 6.00 % Final "   12/02/2015 5.4 4.00 - 6.00 % Final     Comment:     The American Diabetes Association recommends maintenance of Hemoglobin  A1C at 7.0% or lower. Goals for Hemoglobin A1C reduction may need to be  modified if hypoglycemia is a problem.     10/19/2015 5.9 4.00 - 6.00 % Final     Comment:     The American Diabetes Association recommends maintenance of Hemoglobin  A1C at 7.0% or lower. Goals for Hemoglobin A1C reduction may need to be  modified if hypoglycemia is a problem.          Lab Results   Component Value Date     (H) 11/02/2021     (H) 04/29/2019     11/28/2016     Lab Results   Component Value Date    CHOL 241 (H) 11/02/2021    CHOL 228 (H) 04/29/2019    CHOL 229 (H) 11/28/2016     Lab Results   Component Value Date    TRIG 47 11/02/2021    TRIG 58 04/29/2019    TRIG 92 11/28/2016     Lab Results   Component Value Date    HDL 83 (H) 11/02/2021    HDL 94 04/29/2019    HDL 91 11/28/2016     No results found for: PSA  Lab Results   Component Value Date    WBC 5.60 11/02/2021    HGB 13.5 11/02/2021    HCT 41.1 11/02/2021    MCV 89.7 11/02/2021     11/02/2021     Lab Results   Component Value Date    TSH 1.680 02/25/2021      Lab Results   Component Value Date    GLUCOSE 83 11/02/2021    BUN 20 11/02/2021    CREATININE 0.80 11/02/2021    EGFRIFNONA 69 11/02/2021    EGFRIFAFRI 67 12/17/2019    BCR 23.3 11/02/2021    K 3.5 11/02/2021    CO2 26.0 11/02/2021    CALCIUM 10.1 11/02/2021    PROTENTOTREF 6.7 12/17/2019    ALBUMIN 4.6 12/17/2019    LABIL2 2.2 12/17/2019    AST 16 12/17/2019    ALT 13 12/17/2019     No results found for: IVETH, RF, SEDRATE   No results found for: CRP   No results found for: IRON, TIBC, FERRITIN   Lab Results   Component Value Date    WEKPMCEQ18 1,403 (H) 02/25/2021          Assessment & Plan      Medications        Problem List         LOS  Health Maintenance.  Doing well.  Recommend update tetanus vaccine at pharmacy.  Discussed coated baby aspirin daily,  discussed calcium and vitamin D.  Discussed health maintenance, screening test, lifestyle modification.  Followed by OB/GYN, recommend OB/GYN follow-up she states she will consider.  Recommend mammogram she declined secondary to cost.  COVID-19 viral upper respiratory infection.    Improved today/making steady progress.  Has had eval per [facility].  Persistent fatigue, intermittent shortness of breath, loss of smell.  Has had benign cardiology eval, recent heart cath negative for recurrence of AV shunt, lungs negative for PE/pneumonia per angiogram this summer, recommend pulmonology referral she declines currently secondary to cost.  Anxiety.   Has done well on Zoloft in the past, recommend restart or try Lexapro/Trintellix she states she will consider.  On Wellbutrin.  Consider counseling.  Good social support.  Follow-up recheck  Shift work disorder.  Improved on nuvigil, dose increased, continue melatonin  Vitamin D deficiency.  Replace, follow-up recheck.  Mixed hyperlipidemia.  Recommend recheck fasting labs/order given.  Lightheadedness/orthostasis.  h/o hypotension, bradycardia, followed by cardiology / [place], recommend heent eval she declines currently, has had vu in past.  Has had neurology eval per Dr. Hernandez, MRI brain normal 12/19.  AV fistula.  Clinically improved status post embolization [facility].  Recheck echo.  Malaise and fatigue.  possibly 2nd hypotension, rec fludricortisone, midrodrine, she declines 2nd side effects (has had difficulty tolerating this in past, follwed by cardiology)recommend sleep study, + fh sumaya (father), she exercises twice daily  Bradycardia.  Pacemaker out currently/history of pacemaker infection.  Recurrent episode of syncope has cardiology follow-up scheduled/consider ambulatory monitoring.  Increase fluid intake.  Blood work per [facility] was reviewed/benign 2/21.  Restless leg syndrome.  Improved on Mirapex.  Cervical disc disease.  MRI 4/20 with  ruptured disc C4/5, bulging disc multiple levels.  Recommend neurosurgery eval.  Tinnitus.  HEENT referral scheduled  Emphysematous changes in chest x-ray.  Check pulmonary function testing.  Follow-up recheck  Thyroid nodule.  Small 2 to 3 mm right per CT angiogram neck 2015.  Prior history of right hemithyroidectomy secondary to nodule.  Recommend thyroid ultrasound she declines currently secondary to cost states she will consider in future.  Family history colon cancer.  Her father.  History of benign colonoscopy 2011.  Recommend repeat colonoscopy/GI referral scheduled.  Hyperlipidemia.  Worse, LDL to 150.  Discussed diet, lifestyle modification.  Has been out of routine.  Follow-up recheck 1 year.        Diagnoses and all orders for this visit:    1. Nonintractable headache, unspecified chronicity pattern, unspecified headache type (Primary)    2. Anxiety    3. Dizziness    4. Fatigue, unspecified type    5. Shift work sleep disorder    6. Restless legs syndrome (RLS)    7. Migraine without status migrainosus, not intractable, unspecified migraine type    8. Hypotension, unspecified hypotension type    9. COVID-19 virus infection              Expected course, medications, and adverse effects discussed.  Call or return if worsening or persistent symptoms.  I wore protective equipment throughout this patient encounter including a mask, gloves, and eye protection.  Hand hygiene was performed before donning protective equipment and after removal when leaving the room. The complete contents of the Assessment and Plan and Data/Lab Results as documented above have been reviewed and addressed by myself with the patient today as part of an ongoing evaluation / treatment plan.  If some of the documentation has been copied from a previous note and is unchanged it indicates that this problem / plan has been assessed today but is stable from a previous visit and no changes have been recommended.

## 2022-05-18 ENCOUNTER — OFFICE VISIT (OUTPATIENT)
Dept: FAMILY MEDICINE CLINIC | Facility: CLINIC | Age: 57
End: 2022-05-18

## 2022-05-18 VITALS
OXYGEN SATURATION: 100 % | SYSTOLIC BLOOD PRESSURE: 130 MMHG | DIASTOLIC BLOOD PRESSURE: 66 MMHG | HEART RATE: 93 BPM | BODY MASS INDEX: 24.53 KG/M2 | HEIGHT: 69 IN | WEIGHT: 165.6 LBS | RESPIRATION RATE: 18 BRPM | TEMPERATURE: 97.5 F

## 2022-05-18 DIAGNOSIS — R06.02 SOB (SHORTNESS OF BREATH): ICD-10-CM

## 2022-05-18 DIAGNOSIS — R42 DIZZINESS: ICD-10-CM

## 2022-05-18 DIAGNOSIS — G43.809 OTHER MIGRAINE WITHOUT STATUS MIGRAINOSUS, NOT INTRACTABLE: ICD-10-CM

## 2022-05-18 DIAGNOSIS — R53.83 FATIGUE, UNSPECIFIED TYPE: ICD-10-CM

## 2022-05-18 DIAGNOSIS — G25.81 RESTLESS LEGS SYNDROME (RLS): ICD-10-CM

## 2022-05-18 DIAGNOSIS — F41.9 ANXIETY: ICD-10-CM

## 2022-05-18 DIAGNOSIS — R51.9 NONINTRACTABLE HEADACHE, UNSPECIFIED CHRONICITY PATTERN, UNSPECIFIED HEADACHE TYPE: Primary | ICD-10-CM

## 2022-05-18 DIAGNOSIS — I95.9 HYPOTENSION, UNSPECIFIED HYPOTENSION TYPE: ICD-10-CM

## 2022-05-18 DIAGNOSIS — U07.1 COVID-19 VIRUS INFECTION: ICD-10-CM

## 2022-05-18 DIAGNOSIS — G47.26 SHIFT WORK SLEEP DISORDER: ICD-10-CM

## 2022-05-18 DIAGNOSIS — G43.909 MIGRAINE WITHOUT STATUS MIGRAINOSUS, NOT INTRACTABLE, UNSPECIFIED MIGRAINE TYPE: ICD-10-CM

## 2022-05-18 PROCEDURE — 99213 OFFICE O/P EST LOW 20 MIN: CPT | Performed by: FAMILY MEDICINE

## 2022-05-18 RX ORDER — MODAFINIL 200 MG/1
200 TABLET ORAL DAILY
Qty: 90 TABLET | Refills: 0 | Status: SHIPPED | OUTPATIENT
Start: 2022-05-18 | End: 2022-08-17 | Stop reason: SDUPTHER

## 2022-05-18 RX ORDER — MODAFINIL 200 MG/1
200 TABLET ORAL DAILY
Qty: 30 TABLET | Refills: 2 | Status: SHIPPED | OUTPATIENT
Start: 2022-05-18 | End: 2022-05-18 | Stop reason: SDUPTHER

## 2022-05-18 RX ORDER — GABAPENTIN 300 MG/1
CAPSULE ORAL
Qty: 270 CAPSULE | Refills: 0 | Status: SHIPPED | OUTPATIENT
Start: 2022-05-18 | End: 2022-08-17 | Stop reason: SDUPTHER

## 2022-08-06 DIAGNOSIS — F43.0 ACUTE REACTION TO STRESS: ICD-10-CM

## 2022-08-08 RX ORDER — BUPROPION HYDROCHLORIDE 300 MG/1
TABLET ORAL
Qty: 90 TABLET | Refills: 0 | Status: SHIPPED | OUTPATIENT
Start: 2022-08-08 | End: 2023-03-20 | Stop reason: SDUPTHER

## 2022-08-11 ENCOUNTER — HOSPITAL ENCOUNTER (OUTPATIENT)
Dept: ULTRASOUND IMAGING | Facility: HOSPITAL | Age: 57
Discharge: HOME | End: 2022-08-11
Admitting: FAMILY MEDICINE

## 2022-08-11 DIAGNOSIS — E04.1 THYROID NODULE: ICD-10-CM

## 2022-08-11 PROCEDURE — 76536 US EXAM OF HEAD AND NECK: CPT

## 2022-08-15 NOTE — PROGRESS NOTES
Subjective   Joanie Higuera is a 56 y.o. female.     Chief Complaint   Patient presents with   • Anxiety   • Fatigue             Anxiety  Presents for follow-up (patient is on wellbutrin she states that she feels she is fine on it but also has her days. ) visit. Symptoms include decreased concentration, depressed mood, dizziness, insomnia and shortness of breath. Patient reports no chest pain, compulsions, confusion, dry mouth, excessive worry, feeling of choking, hyperventilation, impotence, irritability, malaise, muscle tension, nausea, nervous/anxious behavior, obsessions, palpitations, panic, restlessness or suicidal ideas. Symptoms occur most days. The severity of symptoms is mild and interfering with daily activities. The quality of sleep is fair. Nighttime awakenings: occasional.     Compliance with medications is %.   Fatigue  This is a recurrent (since covid ) problem. The current episode started more than 1 month ago. The problem occurs daily. The problem has been unchanged. Associated symptoms include fatigue. Pertinent negatives include no abdominal pain, chest pain, chills, diaphoresis or nausea. Associated symptoms comments: Patient states that since she has had covid she has not been 100% and she states that it had really done a number on her. She states that she used to be a very outgoing person and was very outdoors and she states that she really just does not have the want or the energy to do so.   08/17/2022-patient states no taste or smell. . The symptoms are aggravated by exertion and walking. She has tried nothing for the symptoms. The treatment provided no relief.            I personally reviewed and updated the patient's allergies, medications, problem list, and past medical, surgical, social, and family history. I have reviewed and confirmed the accuracy of the History of Present Illness and Review of Symptoms as documented by the MA/LPN/RN. Eren Brock MD    Family History    Problem Relation Age of Onset   • Arrhythmia Mother    • Colon cancer Mother    • Other Mother    • Stroke Father    • Diabetes Father    • Parkinsonism Father    • Seizures Father    • Heart disease Father        Social History     Tobacco Use   • Smoking status: Never Smoker   • Smokeless tobacco: Never Used   Vaping Use   • Vaping Use: Never used   Substance Use Topics   • Alcohol use: Yes     Alcohol/week: 5.0 standard drinks     Types: 3 Glasses of wine, 2 Standard drinks or equivalent per week     Comment: social   • Drug use: No       Past Surgical History:   Procedure Laterality Date   • ARTERIOVENOUS FISTULA REPAIR  2015    ENDO REPAIR BY DR KAPLAN   • CARDIAC CATHETERIZATION     • CARDIAC CATHETERIZATION  08/05/2016    RIGHT HEART CATH PER DR. COLEMAN   • CARDIAC CATHETERIZATION N/A 8/5/2016    Procedure: Right Heart Cath;  Surgeon: Holley Coleman MD;  Location:  [facility];  Service:    • CARDIAC CATHETERIZATION Bilateral 11/2/2021    Procedure: Right and Left Heart Cath;  Surgeon: Holley Coleman MD;  Location:  [facility];  Service: Cardiology;  Laterality: Bilateral;   • CARDIAC PACEMAKER REMOVAL  2012    FOR [facility] INFECTION   • FOOT SURGERY Right    • HYSTERECTOMY      ovaries remain   • PACEMAKER IMPLANTATION  1991 - ORIGINAL   • THYROID LOBECTOMY Left        Patient Active Problem List   Diagnosis   • SOB (shortness of breath)   • A-V fistula (HCC)   • Bursitis of hip   • PONV (postoperative nausea and vomiting)   • Ischemic colitis (HCC)   • Syncope, cardiogenic   • MRSA infection   • Thyroid nodule   • Arthritis   • Dizziness   • Migraine without status migrainosus, not intractable   • Restless legs syndrome (RLS)   • Menopause   • Pacemaker infection (HCC)   • Palpitations   • Acute reaction to stress   • Tachycardia   • Allergic rhinitis   • Asthma   • GERD (gastroesophageal reflux disease)   • Heart murmur   • Mixed hyperlipidemia   •  Hypotension   • RLS (restless legs syndrome)   • Acute adjustment disorder with mixed anxiety and depressed mood   • Shift work sleep disorder   • Pacemaker   • COVID-19 virus infection   • Annual visit for general adult medical examination with abnormal findings   • Encounter for screening mammogram for malignant neoplasm of breast   • Screening for malignant neoplasm of colon   • Other headache syndrome   • Anxiety   • Fear of flying         Current Outpatient Medications:   •  buPROPion XL (WELLBUTRIN XL) 300 MG 24 hr tablet, TAKE 1 TABLET BY MOUTH EVERY DAY, Disp: 90 tablet, Rfl: 0  •  cetirizine (ZyrTEC) 10 MG tablet, Take 10 mg by mouth at night as needed for allergies., Disp: , Rfl:   •  gabapentin (NEURONTIN) 300 MG capsule, Take 1 capsule by mouth 3 (Three) Times a Day., Disp: 270 capsule, Rfl: 0  •  LORazepam (ATIVAN) 0.5 MG tablet, Take 1 tablet by mouth Every 6 (Six) Hours As Needed for Anxiety., Disp: 15 tablet, Rfl: 1  •  Magnesium Oxide 400 MG capsule, Take 400 mg by mouth As Needed., Disp: , Rfl:   •  meloxicam (MOBIC) 15 MG tablet, Take 1 tablet by mouth Daily., Disp: 90 tablet, Rfl: 0  •  modafinil (PROVIGIL) 200 MG tablet, Take 1 tablet by mouth Daily., Disp: 90 tablet, Rfl: 0  •  Multiple Vitamins-Minerals (MULTIVITAMIN ADULT PO), Take 1 tablet by mouth every morning., Disp: , Rfl:   •  pramipexole (MIRAPEX) 0.125 MG tablet, Take 1 to 2 tablets nightly as needed, Disp: 180 tablet, Rfl: 3  •  topiramate (TOPAMAX) 50 MG tablet, Take 1 tablet by mouth every night at bedtime., Disp: 90 tablet, Rfl: 0  •  vitamin D (ERGOCALCIFEROL) 1.25 MG (28538 UT) capsule capsule, Take 1 capsule by mouth 1 (One) Time Per Week., Disp: 6 capsule, Rfl: 0         Review of Systems   Constitutional: Positive for fatigue. Negative for chills, diaphoresis and irritability.   Eyes: Negative for visual disturbance.   Respiratory: Positive for shortness of breath.    Cardiovascular: Negative for chest pain and palpitations.  "  Gastrointestinal: Negative for abdominal pain and nausea.   Endocrine: Negative for polydipsia and polyphagia.   Genitourinary: Negative for impotence.   Musculoskeletal: Negative for neck stiffness.   Skin: Negative for color change and pallor.   Neurological: Positive for dizziness. Negative for seizures, syncope and confusion.   Hematological: Negative for adenopathy.   Psychiatric/Behavioral: Positive for decreased concentration and depressed mood. Negative for hallucinations and suicidal ideas. The patient has insomnia. The patient is not nervous/anxious.        I have reviewed and confirmed the accuracy of the ROS as documented by the MA/LPN/RN Eren Brock MD      Objective   Ht 175.3 cm (69\") Comment: patient reported  Wt 72.6 kg (160 lb) Comment: patient reported  BMI 23.63 kg/m²   BP Readings from Last 3 Encounters:   05/18/22 130/66   12/03/21 126/98   11/19/21 122/50     Wt Readings from Last 3 Encounters:   08/17/22 72.6 kg (160 lb)   05/18/22 75.1 kg (165 lb 9.6 oz)   12/03/21 75.8 kg (167 lb 3.2 oz)     Physical Exam    Data / Lab Results:    Hemoglobin A1C   Date Value Ref Range Status   11/02/2021 5.40 4.80 - 5.60 % Final   08/04/2016 5.20 4.00 - 6.00 % Final   12/02/2015 5.4 4.00 - 6.00 % Final     Comment:     The American Diabetes Association recommends maintenance of Hemoglobin  A1C at 7.0% or lower. Goals for Hemoglobin A1C reduction may need to be  modified if hypoglycemia is a problem.     10/19/2015 5.9 4.00 - 6.00 % Final     Comment:     The American Diabetes Association recommends maintenance of Hemoglobin  A1C at 7.0% or lower. Goals for Hemoglobin A1C reduction may need to be  modified if hypoglycemia is a problem.          Lab Results   Component Value Date     (H) 11/02/2021     (H) 04/29/2019     11/28/2016     Lab Results   Component Value Date    CHOL 241 (H) 11/02/2021    CHOL 228 (H) 04/29/2019    CHOL 229 (H) 11/28/2016     Lab Results   Component " Value Date    TRIG 47 11/02/2021    TRIG 58 04/29/2019    TRIG 92 11/28/2016     Lab Results   Component Value Date    HDL 83 (H) 11/02/2021    HDL 94 04/29/2019    HDL 91 11/28/2016     No results found for: PSA  Lab Results   Component Value Date    WBC 5.60 11/02/2021    HGB 13.5 11/02/2021    HCT 41.1 11/02/2021    MCV 89.7 11/02/2021     11/02/2021     Lab Results   Component Value Date    TSH 1.680 02/25/2021      Lab Results   Component Value Date    GLUCOSE 83 11/02/2021    BUN 20 11/02/2021    CREATININE 0.80 11/02/2021    EGFRIFNONA 69 11/02/2021    EGFRIFAFRI 67 12/17/2019    BCR 23.3 11/02/2021    K 3.5 11/02/2021    CO2 26.0 11/02/2021    CALCIUM 10.1 11/02/2021    PROTENTOTREF 6.7 12/17/2019    ALBUMIN 4.6 12/17/2019    LABIL2 2.2 12/17/2019    AST 16 12/17/2019    ALT 13 12/17/2019     No results found for: IVETH, RF, SEDRATE   No results found for: CRP   No results found for: IRON, TIBC, FERRITIN   Lab Results   Component Value Date    ORJWVJVM84 1,403 (H) 02/25/2021        Joaniejonnie Higuera consented to undergo a telephone visit today.  This format was necessitated by the current covid-19 virus pandemic.  23 minutes was spent on the phone today with Joanie discussing her acute concerns and chronic medical problems.    Assessment & Plan      Medications        Problem List         LOS  Health Maintenance.  Doing well.  Recommend update tetanus vaccine at pharmacy.  Discussed coated baby aspirin daily, discussed calcium and vitamin D.  Discussed health maintenance, screening test, lifestyle modification.  Followed by OB/GYN, recommend OB/GYN follow-up she states she will consider.  Recommend mammogram she declined secondary to cost.  COVID-19 viral upper respiratory infection.    Improved today/making steady progress.  Has had eval per [facility].  Persistent fatigue, intermittent shortness of breath, loss of smell.  Has had benign cardiology eval, recent heart cath negative for  recurrence of AV shunt, lungs negative for PE/pneumonia per angiogram this summer, recommend pulmonology referral she declines currently secondary to cost.  Anxiety.   Has done well on Zoloft in the past, recommend restart or try Lexapro/Trintellix she states she will consider.  On Wellbutrin.  Consider counseling.  Good social support.  Follow-up recheck  Shift work disorder.  Improved on nuvigil, dose increased, continue melatonin  Vitamin D deficiency.  Replace, follow-up recheck.  Mixed hyperlipidemia.  Recommend recheck fasting labs/order given.  Lightheadedness/orthostasis.  h/o hypotension, bradycardia, followed by cardiology / [place], recommend heent eval she declines currently, has had vu in past.  Has had neurology eval per Dr. Hernandez, MRI brain normal 12/19.  AV fistula.  Clinically improved status post embolization [facility].  Recheck echo.  Malaise and fatigue.  possibly 2nd hypotension, rec fludricortisone, midrodrine, she declines 2nd side effects (has had difficulty tolerating this in past, follwed by cardiology)recommend sleep study, + fh sumaya (father), she exercises twice daily  Bradycardia.  Pacemaker out currently/history of pacemaker infection.  Recurrent episode of syncope has cardiology follow-up scheduled/consider ambulatory monitoring.  Increase fluid intake.  Blood work per [facility] was reviewed/benign 2/21.  Restless leg syndrome.  Improved on Mirapex.  Cervical disc disease.  MRI 4/20 with ruptured disc C4/5, bulging disc multiple levels.  Recommend neurosurgery eval.  Tinnitus.  HEENT referral scheduled  Emphysematous changes in chest x-ray.  Check pulmonary function testing.  Follow-up recheck  Thyroid nodule.  1.7 cm nodule right thyroid per ultrasound 4/22, no suspicious findings, repeat ultrasound 1 year.  Small 2 to 3 mm right per CT angiogram neck 2015.  Prior history of right hemithyroidectomy secondary to nodule.    Family history colon cancer.  Her father.  History of  benign colonoscopy 2011.  Recommend repeat colonoscopy/GI referral scheduled.  Hyperlipidemia.  Worse, LDL to 150.  Discussed diet, lifestyle modification.  Has been out of routine.  Follow-up recheck 1 year..           Problem List Items Addressed This Visit        Unprioritized    SOB (shortness of breath)    Relevant Medications    modafinil (PROVIGIL) 200 MG tablet    Migraine without status migrainosus, not intractable    Relevant Medications    gabapentin (NEURONTIN) 300 MG capsule    Restless legs syndrome (RLS)    Relevant Medications    gabapentin (NEURONTIN) 300 MG capsule    Anxiety - Primary      Other Visit Diagnoses     Fatigue, unspecified type                  Expected course, medications, and adverse effects discussed.  Call or return if worsening or persistent symptoms.  The complete contents of the Assessment and Plan and Data / Lab Results as documented above have been reviewed and addressed by myself with the patient today as part of an ongoing evaluation / treatment plan.  If some of the documentation has been copied from a previous note and is unchanged it indicates that this problem / plan has been assessed today but is stable from a previous visit and no changes have been recommended.

## 2022-08-17 ENCOUNTER — OFFICE VISIT (OUTPATIENT)
Dept: FAMILY MEDICINE CLINIC | Facility: CLINIC | Age: 57
End: 2022-08-17

## 2022-08-17 VITALS — WEIGHT: 160 LBS | HEIGHT: 69 IN | BODY MASS INDEX: 23.7 KG/M2

## 2022-08-17 DIAGNOSIS — G25.81 RESTLESS LEGS SYNDROME (RLS): ICD-10-CM

## 2022-08-17 DIAGNOSIS — R06.02 SOB (SHORTNESS OF BREATH): ICD-10-CM

## 2022-08-17 DIAGNOSIS — F41.9 ANXIETY: Primary | ICD-10-CM

## 2022-08-17 DIAGNOSIS — R53.83 FATIGUE, UNSPECIFIED TYPE: ICD-10-CM

## 2022-08-17 DIAGNOSIS — G43.809 OTHER MIGRAINE WITHOUT STATUS MIGRAINOSUS, NOT INTRACTABLE: ICD-10-CM

## 2022-08-17 PROCEDURE — 99443 PR PHYS/QHP TELEPHONE EVALUATION 21-30 MIN: CPT | Performed by: FAMILY MEDICINE

## 2022-08-17 RX ORDER — GABAPENTIN 300 MG/1
300 CAPSULE ORAL 3 TIMES DAILY
Qty: 270 CAPSULE | Refills: 0 | Status: SHIPPED | OUTPATIENT
Start: 2022-08-17 | End: 2022-12-19

## 2022-08-17 RX ORDER — MODAFINIL 200 MG/1
200 TABLET ORAL DAILY
Qty: 90 TABLET | Refills: 0 | Status: SHIPPED | OUTPATIENT
Start: 2022-08-17 | End: 2022-11-16 | Stop reason: SDUPTHER

## 2022-08-26 DIAGNOSIS — G25.81 RESTLESS LEGS SYNDROME (RLS): ICD-10-CM

## 2022-08-26 RX ORDER — MELOXICAM 15 MG/1
TABLET ORAL
Qty: 90 TABLET | Refills: 0 | Status: SHIPPED | OUTPATIENT
Start: 2022-08-26 | End: 2022-11-23

## 2022-08-31 ENCOUNTER — TELEPHONE (OUTPATIENT)
Dept: FAMILY MEDICINE CLINIC | Facility: CLINIC | Age: 57
End: 2022-08-31

## 2022-09-09 ENCOUNTER — TELEPHONE (OUTPATIENT)
Dept: FAMILY MEDICINE CLINIC | Facility: CLINIC | Age: 57
End: 2022-09-09

## 2022-09-09 DIAGNOSIS — E04.1 THYROID NODULE: Primary | ICD-10-CM

## 2022-09-09 NOTE — TELEPHONE ENCOUNTER
----- Message from Joanie Higuera sent at 9/9/2022  8:39 AM EDT -----  Regarding: Thyroid   Dr. Souza in [place] is fine. Thanks.

## 2022-09-22 DIAGNOSIS — F43.0 ACUTE REACTION TO STRESS: ICD-10-CM

## 2022-09-23 RX ORDER — LORAZEPAM 0.5 MG/1
TABLET ORAL
Qty: 30 TABLET | Refills: 0 | Status: SHIPPED | OUTPATIENT
Start: 2022-09-23 | End: 2022-12-19

## 2022-11-16 ENCOUNTER — OFFICE VISIT (OUTPATIENT)
Dept: FAMILY MEDICINE CLINIC | Facility: CLINIC | Age: 57
End: 2022-11-16

## 2022-11-16 VITALS — WEIGHT: 160 LBS | HEIGHT: 69 IN | BODY MASS INDEX: 23.7 KG/M2

## 2022-11-16 DIAGNOSIS — E04.1 THYROID NODULE: ICD-10-CM

## 2022-11-16 DIAGNOSIS — I77.0 A-V FISTULA: ICD-10-CM

## 2022-11-16 DIAGNOSIS — F43.23 ACUTE ADJUSTMENT DISORDER WITH MIXED ANXIETY AND DEPRESSED MOOD: ICD-10-CM

## 2022-11-16 DIAGNOSIS — R06.02 SOB (SHORTNESS OF BREATH): ICD-10-CM

## 2022-11-16 DIAGNOSIS — F41.9 ANXIETY: Primary | ICD-10-CM

## 2022-11-16 PROCEDURE — 99443 PR PHYS/QHP TELEPHONE EVALUATION 21-30 MIN: CPT | Performed by: FAMILY MEDICINE

## 2022-11-16 RX ORDER — MODAFINIL 200 MG/1
200 TABLET ORAL DAILY
Qty: 90 TABLET | Refills: 0 | Status: SHIPPED | OUTPATIENT
Start: 2022-11-16 | End: 2023-03-20 | Stop reason: SDUPTHER

## 2022-11-23 DIAGNOSIS — G25.81 RESTLESS LEGS SYNDROME (RLS): ICD-10-CM

## 2022-11-23 RX ORDER — MELOXICAM 15 MG/1
TABLET ORAL
Qty: 90 TABLET | Refills: 0 | Status: SHIPPED | OUTPATIENT
Start: 2022-11-23 | End: 2023-03-20 | Stop reason: SDUPTHER

## 2022-12-17 DIAGNOSIS — G25.81 RESTLESS LEGS SYNDROME (RLS): ICD-10-CM

## 2022-12-17 DIAGNOSIS — G43.809 OTHER MIGRAINE WITHOUT STATUS MIGRAINOSUS, NOT INTRACTABLE: ICD-10-CM

## 2022-12-17 DIAGNOSIS — F43.0 ACUTE REACTION TO STRESS: ICD-10-CM

## 2022-12-19 RX ORDER — LORAZEPAM 0.5 MG/1
TABLET ORAL
Qty: 30 TABLET | Refills: 0 | Status: SHIPPED | OUTPATIENT
Start: 2022-12-19 | End: 2023-03-29 | Stop reason: SDUPTHER

## 2022-12-19 RX ORDER — GABAPENTIN 300 MG/1
CAPSULE ORAL
Qty: 270 CAPSULE | Refills: 0 | Status: SHIPPED | OUTPATIENT
Start: 2022-12-19 | End: 2023-03-20 | Stop reason: SDUPTHER

## 2023-03-20 DIAGNOSIS — G25.81 RESTLESS LEGS SYNDROME (RLS): ICD-10-CM

## 2023-03-20 DIAGNOSIS — G43.809 OTHER MIGRAINE WITHOUT STATUS MIGRAINOSUS, NOT INTRACTABLE: ICD-10-CM

## 2023-03-20 DIAGNOSIS — F43.0 ACUTE REACTION TO STRESS: ICD-10-CM

## 2023-03-20 DIAGNOSIS — R06.02 SOB (SHORTNESS OF BREATH): ICD-10-CM

## 2023-03-21 RX ORDER — BUPROPION HYDROCHLORIDE 300 MG/1
300 TABLET ORAL DAILY
Qty: 90 TABLET | Refills: 0 | Status: SHIPPED | OUTPATIENT
Start: 2023-03-21

## 2023-03-21 RX ORDER — GABAPENTIN 300 MG/1
300 CAPSULE ORAL 3 TIMES DAILY
Qty: 270 CAPSULE | Refills: 0 | Status: SHIPPED | OUTPATIENT
Start: 2023-03-21

## 2023-03-21 RX ORDER — MELOXICAM 15 MG/1
15 TABLET ORAL DAILY
Qty: 90 TABLET | Refills: 0 | Status: SHIPPED | OUTPATIENT
Start: 2023-03-21

## 2023-03-21 RX ORDER — TOPIRAMATE 50 MG/1
50 TABLET, FILM COATED ORAL
Qty: 90 TABLET | Refills: 0 | Status: SHIPPED | OUTPATIENT
Start: 2023-03-21

## 2023-03-21 RX ORDER — PRAMIPEXOLE DIHYDROCHLORIDE 0.12 MG/1
TABLET ORAL
Qty: 180 TABLET | Refills: 3 | Status: SHIPPED | OUTPATIENT
Start: 2023-03-21

## 2023-03-22 RX ORDER — MODAFINIL 200 MG/1
200 TABLET ORAL DAILY
Qty: 90 TABLET | Refills: 0 | Status: SHIPPED | OUTPATIENT
Start: 2023-03-22 | End: 2023-03-29 | Stop reason: SDUPTHER

## 2023-03-28 NOTE — PROGRESS NOTES
Subjective   Joanie Higuera is a 57 y.o. female.     Chief Complaint   Patient presents with   • Anxiety   • URI       History of Present Illness      Anxiety  Presents for follow-up (patient is on wellbutrin she states that she feels she is fine on it but also has her days. ) visit. Symptoms include decreased concentration, depressed mood, dizziness and shortness of breath. Patient reports no chest pain, compulsions, confusion, dry mouth, excessive worry, feeling of choking, hyperventilation, impotence, irritability, malaise, muscle tension, nausea, nervous/anxious behavior, obsessions, palpitations, panic, restlessness or suicidal ideas. Symptoms occur most days. The severity of symptoms is mild and interfering with daily activities. The patient sleeps 5 hours per night. The quality of sleep is poor. Nighttime awakenings: occasional, several.     Compliance with medications is %.   URI   This is a new problem. The current episode started in the past 7 days. The problem has been unchanged. The maximum temperature recorded prior to her arrival was 101 - 101.9 F. Associated symptoms include congestion, coughing, ear pain, headaches, a plugged ear sensation, rhinorrhea and a sore throat. Pertinent negatives include no abdominal pain, chest pain, nausea, sneezing or vomiting. She has tried decongestant and antihistamine for the symptoms. The treatment provided mild relief.            I personally reviewed and updated the patient's allergies, medications, problem list, and past medical, surgical, social, and family history. I have reviewed and confirmed the accuracy of the History of Present Illness and Review of Symptoms as documented by the MA/LUIS/RN. Eren Brock MD    Family History   Problem Relation Age of Onset   • Arrhythmia Mother    • Colon cancer Mother    • Other Mother    • Cancer Mother    • Stroke Father    • Diabetes Father    • Parkinsonism Father    • Seizures Father    • Heart disease  Father        Social History     Tobacco Use   • Smoking status: Never   • Smokeless tobacco: Never   Vaping Use   • Vaping Use: Never used   Substance Use Topics   • Alcohol use: Yes     Alcohol/week: 3.0 standard drinks     Types: 3 Glasses of wine per week     Comment: social   • Drug use: No       Past Surgical History:   Procedure Laterality Date   • ARTERIOVENOUS FISTULA REPAIR  2015    ENDO REPAIR BY DR KAPLAN   • CARDIAC CATHETERIZATION     • CARDIAC CATHETERIZATION  08/05/2016    RIGHT HEART CATH PER DR. COLEMAN   • CARDIAC CATHETERIZATION N/A 8/5/2016    Procedure: Right Heart Cath;  Surgeon: Holley Coleman MD;  Location:  [facility];  Service:    • CARDIAC CATHETERIZATION Bilateral 11/2/2021    Procedure: Right and Left Heart Cath;  Surgeon: Holley Coleman MD;  Location:  [facility];  Service: Cardiology;  Laterality: Bilateral;   • CARDIAC PACEMAKER REMOVAL  2012    FOR [facility] INFECTION   • FOOT SURGERY Right    • HYSTERECTOMY      ovaries remain   • PACEMAKER IMPLANTATION  1991 - ORIGINAL   • THYROID LOBECTOMY Left        Patient Active Problem List   Diagnosis   • SOB (shortness of breath)   • A-V fistula (HCC)   • Bursitis of hip   • PONV (postoperative nausea and vomiting)   • Ischemic colitis (HCC)   • Syncope, cardiogenic   • MRSA infection   • Thyroid nodule   • Arthritis   • Dizziness   • Migraine without status migrainosus, not intractable   • Restless legs syndrome (RLS)   • Menopause   • Pacemaker infection (HCC)   • Palpitations   • Acute reaction to stress   • Tachycardia   • Allergic rhinitis   • Asthma   • GERD (gastroesophageal reflux disease)   • Heart murmur   • Mixed hyperlipidemia   • Hypotension   • RLS (restless legs syndrome)   • Acute adjustment disorder with mixed anxiety and depressed mood   • Shift work sleep disorder   • Pacemaker   • COVID-19 virus infection   • Annual visit for general adult medical examination with  abnormal findings   • Encounter for screening mammogram for malignant neoplasm of breast   • Screening for malignant neoplasm of colon   • Other headache syndrome   • Anxiety   • Fear of flying   • Acute bacterial bronchitis         Current Outpatient Medications:   •  buPROPion XL (WELLBUTRIN XL) 300 MG 24 hr tablet, Take 1 tablet by mouth Daily., Disp: 90 tablet, Rfl: 0  •  cetirizine (ZyrTEC) 10 MG tablet, Take 1 tablet by mouth At Night As Needed for Allergies., Disp: , Rfl:   •  gabapentin (NEURONTIN) 300 MG capsule, Take 1 capsule by mouth 3 (Three) Times a Day., Disp: 270 capsule, Rfl: 0  •  LORazepam (ATIVAN) 0.5 MG tablet, Take 1 tablet by mouth Every 6 (Six) Hours As Needed for Anxiety. for anxiety, Disp: 30 tablet, Rfl: 0  •  meloxicam (MOBIC) 15 MG tablet, Take 1 tablet by mouth Daily., Disp: 90 tablet, Rfl: 0  •  modafinil (PROVIGIL) 200 MG tablet, Take 1 tablet by mouth Daily., Disp: 90 tablet, Rfl: 0  •  pramipexole (MIRAPEX) 0.125 MG tablet, Take 1 to 2 tablets nightly as needed, Disp: 180 tablet, Rfl: 3  •  topiramate (TOPAMAX) 50 MG tablet, Take 1 tablet by mouth every night at bedtime., Disp: 90 tablet, Rfl: 0  •  albuterol sulfate  (90 Base) MCG/ACT inhaler, Inhale 2 puffs Every 4 (Four) Hours As Needed for Wheezing., Disp: 18 g, Rfl: 2  •  azithromycin (ZITHROMAX) 250 MG tablet, Take 2 tablets the first day, then 1 tablet daily for 4 days., Disp: 6 tablet, Rfl: 0         Review of Systems   Constitutional: Negative for chills, diaphoresis and irritability.   HENT: Positive for congestion, ear pain, rhinorrhea and sore throat. Negative for sneezing.    Eyes: Negative for visual disturbance.   Respiratory: Positive for cough and shortness of breath.    Cardiovascular: Negative for chest pain and palpitations.   Gastrointestinal: Negative for abdominal pain, nausea and vomiting.   Endocrine: Negative for polydipsia and polyphagia.   Genitourinary: Negative for impotence.   Musculoskeletal:  "Negative for neck stiffness.   Skin: Negative for color change and pallor.   Neurological: Positive for dizziness. Negative for seizures, syncope and confusion.   Hematological: Negative for adenopathy.   Psychiatric/Behavioral: Positive for decreased concentration and depressed mood. Negative for hallucinations and suicidal ideas. The patient is not nervous/anxious.        I have reviewed and confirmed the accuracy of the ROS as documented by the MA/LPN/RN Eren Brock MD      Objective   Ht 175.3 cm (69\")   Wt 72.6 kg (160 lb)   BMI 23.63 kg/m²   BP Readings from Last 3 Encounters:   05/18/22 130/66   12/03/21 126/98   11/19/21 122/50     Wt Readings from Last 3 Encounters:   03/29/23 72.6 kg (160 lb)   11/16/22 72.6 kg (160 lb)   08/17/22 72.6 kg (160 lb)     Physical Exam    Data / Lab Results:    Hemoglobin A1C   Date Value Ref Range Status   11/02/2021 5.40 4.80 - 5.60 % Final   08/04/2016 5.20 4.00 - 6.00 % Final   12/02/2015 5.4 4.00 - 6.00 % Final     Comment:     The American Diabetes Association recommends maintenance of Hemoglobin  A1C at 7.0% or lower. Goals for Hemoglobin A1C reduction may need to be  modified if hypoglycemia is a problem.     10/19/2015 5.9 4.00 - 6.00 % Final     Comment:     The American Diabetes Association recommends maintenance of Hemoglobin  A1C at 7.0% or lower. Goals for Hemoglobin A1C reduction may need to be  modified if hypoglycemia is a problem.          Lab Results   Component Value Date     (H) 11/02/2021     (H) 04/29/2019     11/28/2016     Lab Results   Component Value Date    CHOL 241 (H) 11/02/2021    CHOL 228 (H) 04/29/2019    CHOL 229 (H) 11/28/2016     Lab Results   Component Value Date    TRIG 47 11/02/2021    TRIG 58 04/29/2019    TRIG 92 11/28/2016     Lab Results   Component Value Date    HDL 83 (H) 11/02/2021    HDL 94 04/29/2019    HDL 91 11/28/2016     No results found for: PSA  Lab Results   Component Value Date    WBC 5.60 " 11/02/2021    HGB 13.5 11/02/2021    HCT 41.1 11/02/2021    MCV 89.7 11/02/2021     11/02/2021     Lab Results   Component Value Date    TSH 1.680 02/25/2021     Lab Results   Component Value Date    GLUCOSE 83 11/02/2021    BUN 20 11/02/2021    CREATININE 0.80 11/02/2021    EGFRIFNONA 69 11/02/2021    EGFRIFAFRI 67 12/17/2019    BCR 23.3 11/02/2021    K 3.5 11/02/2021    CO2 26.0 11/02/2021    CALCIUM 10.1 11/02/2021    PROTENTOTREF 6.7 12/17/2019    ALBUMIN 4.6 12/17/2019    LABIL2 2.2 12/17/2019    AST 16 12/17/2019    ALT 13 12/17/2019     No results found for: IVETH, RF, SEDRATE   No results found for: CRP   No results found for: IRON, TIBC, FERRITIN   Lab Results   Component Value Date    AUFUNFIB75 1,403 (H) 02/25/2021        Joanie Higuera consented to undergoing telephone visit today.  This format was necessitated by the current COVID-19 viral pandemic.  During the encounter I was located in the office, Joaniejonnie Higuera was located in their personal residence.  23 minutes spent on phone today with Joanie discussing her acute concerns and chronic medical problems.      Assessment & Plan      Medications        Problem List         LOS  Health Maintenance.  Doing well.  Recommend update tetanus vaccine at pharmacy.  Discussed coated baby aspirin daily, discussed calcium and vitamin D.  Discussed health maintenance, screening test, lifestyle modification.  Followed by OB/GYN, recommend OB/GYN follow-up she states she will consider.  Recommend mammogram she declined secondary to cost.  COVID-19 viral upper respiratory infection.    Improved today/making steady progress.  Has had eval per [facility].  Persistent fatigue, intermittent shortness of breath, loss of smell.  Has had benign cardiology eval, recent heart cath negative for recurrence of AV shunt, lungs negative for PE/pneumonia per angiogram this summer, recommend pulmonology referral she declines currently secondary to  cost.  Anxiety.   Has done well on Zoloft in the past, recommend restart or try Lexapro/Trintellix she states she will consider.  On Wellbutrin.  Consider counseling.  Good social support.  Follow-up recheck  Shift work disorder.  Improved on nuvigil, dose increased, continue melatonin  Vitamin D deficiency.  Replace, follow-up recheck.  Mixed hyperlipidemia.  Recommend recheck fasting labs/order given.  Lightheadedness/orthostasis.  h/o hypotension, bradycardia, followed by cardiology / [place], recommend heent eval she declines currently, has had vu in past.  Has had neurology eval per Dr. Hernandez, MRI brain normal 12/19.  AV fistula.  Clinically improved status post embolization [facility].  Recheck echo.  Malaise and fatigue.  possibly 2nd hypotension, rec fludricortisone, midrodrine, she declines 2nd side effects (has had difficulty tolerating this in past, follwed by cardiology)recommend sleep study, + fh sumaya (father), she exercises twice daily  Bradycardia.  Pacemaker out currently/history of pacemaker infection.  Recurrent episode of syncope has cardiology follow-up scheduled/consider ambulatory monitoring.  Increase fluid intake.  Blood work per [facility] was reviewed/benign 2/21.  Restless leg syndrome.  Improved on Mirapex.  Cervical disc disease.  MRI 4/20 with ruptured disc C4/5, bulging disc multiple levels.  Recommend neurosurgery eval.  Tinnitus.  HEENT referral scheduled  Emphysematous changes in chest x-ray.  Check pulmonary function testing.  Follow-up recheck  Thyroid nodule.  1.7 cm nodule right thyroid per ultrasound 4/22, no suspicious findings, repeat ultrasound 1 year.  Small 2 to 3 mm right per CT angiogram neck 2015.  Prior history of right hemithyroidectomy secondary to nodule.    Family history colon cancer.  Her father.  History of benign colonoscopy 2011.  Recommend repeat colonoscopy/GI referral scheduled.  Hyperlipidemia.  Worse, LDL to 150.  Discussed diet, lifestyle  modification.  Has been out of routine.  Follow-up recheck 1 year..  Acute bacterial bronchitis.  Start antibiotics.  Call return if fever worsening symptoms      Problem List Items Addressed This Visit        Unprioritized    Acute adjustment disorder with mixed anxiety and depressed mood    Relevant Medications    LORazepam (ATIVAN) 0.5 MG tablet    modafinil (PROVIGIL) 200 MG tablet    Acute bacterial bronchitis    Relevant Medications    azithromycin (ZITHROMAX) 250 MG tablet    albuterol sulfate  (90 Base) MCG/ACT inhaler    Acute reaction to stress    Relevant Medications    LORazepam (ATIVAN) 0.5 MG tablet    modafinil (PROVIGIL) 200 MG tablet    Anxiety - Primary    Shift work sleep disorder    SOB (shortness of breath)    Relevant Medications    modafinil (PROVIGIL) 200 MG tablet   Other Visit Diagnoses     Acute URI        Relevant Medications    azithromycin (ZITHROMAX) 250 MG tablet              Expected course, medications, and adverse effects discussed.  Call or return if worsening or persistent symptoms.  The complete contents of the Assessment and Plan and Data / Lab Results as documented above have been reviewed and addressed by myself with the patient today as part of an ongoing evaluation / treatment plan.  If some of the documentation has been copied from a previous note and is unchanged it indicates that this problem / plan has been assessed today but is stable from a previous visit and no changes have been recommended.

## 2023-03-29 ENCOUNTER — OFFICE VISIT (OUTPATIENT)
Dept: FAMILY MEDICINE CLINIC | Facility: CLINIC | Age: 58
End: 2023-03-29
Payer: COMMERCIAL

## 2023-03-29 VITALS — WEIGHT: 160 LBS | BODY MASS INDEX: 23.7 KG/M2 | HEIGHT: 69 IN

## 2023-03-29 DIAGNOSIS — B96.89 ACUTE BACTERIAL BRONCHITIS: ICD-10-CM

## 2023-03-29 DIAGNOSIS — J06.9 ACUTE URI: ICD-10-CM

## 2023-03-29 DIAGNOSIS — F43.23 ACUTE ADJUSTMENT DISORDER WITH MIXED ANXIETY AND DEPRESSED MOOD: ICD-10-CM

## 2023-03-29 DIAGNOSIS — J20.8 ACUTE BACTERIAL BRONCHITIS: ICD-10-CM

## 2023-03-29 DIAGNOSIS — R06.02 SOB (SHORTNESS OF BREATH): ICD-10-CM

## 2023-03-29 DIAGNOSIS — F43.0 ACUTE REACTION TO STRESS: ICD-10-CM

## 2023-03-29 DIAGNOSIS — G47.26 SHIFT WORK SLEEP DISORDER: ICD-10-CM

## 2023-03-29 DIAGNOSIS — F41.9 ANXIETY: Primary | ICD-10-CM

## 2023-03-29 PROCEDURE — 99443 PR PHYS/QHP TELEPHONE EVALUATION 21-30 MIN: CPT | Performed by: FAMILY MEDICINE

## 2023-03-29 RX ORDER — AZITHROMYCIN 250 MG/1
TABLET, FILM COATED ORAL
Qty: 6 TABLET | Refills: 0 | Status: SHIPPED | OUTPATIENT
Start: 2023-03-29

## 2023-03-29 RX ORDER — ALBUTEROL SULFATE 90 UG/1
2 AEROSOL, METERED RESPIRATORY (INHALATION) EVERY 4 HOURS PRN
Qty: 18 G | Refills: 2 | Status: SHIPPED | OUTPATIENT
Start: 2023-03-29

## 2023-03-29 RX ORDER — MODAFINIL 200 MG/1
200 TABLET ORAL DAILY
Qty: 90 TABLET | Refills: 0 | Status: SHIPPED | OUTPATIENT
Start: 2023-03-29

## 2023-03-29 RX ORDER — LORAZEPAM 0.5 MG/1
0.5 TABLET ORAL EVERY 6 HOURS PRN
Qty: 30 TABLET | Refills: 0 | Status: SHIPPED | OUTPATIENT
Start: 2023-03-29

## 2023-05-26 ENCOUNTER — TELEPHONE (OUTPATIENT)
Dept: FAMILY MEDICINE CLINIC | Facility: CLINIC | Age: 58
End: 2023-05-26
Payer: COMMERCIAL

## 2023-05-26 DIAGNOSIS — M62.838 MUSCLE SPASM: Primary | ICD-10-CM

## 2023-05-26 RX ORDER — CYCLOBENZAPRINE HCL 10 MG
10 TABLET ORAL EVERY 12 HOURS PRN
Qty: 30 TABLET | Refills: 0 | Status: SHIPPED | OUTPATIENT
Start: 2023-05-26

## 2023-05-26 NOTE — TELEPHONE ENCOUNTER
Called patient to let her know we have sent in Flexeril 10 mg 1 tablet every 12 hours as needed for muscle spasms  LMOM for her to let her know this has been sent in

## 2023-05-26 NOTE — TELEPHONE ENCOUNTER
----- Message from Joanie Higuera sent at 5/26/2023 12:40 PM EDT -----  Regarding: Prescription  Contact: [phone]  I have a very painful pinched nerve and can’t get in to the chiropractor until next week. Is it possible to call in a muscle relaxer to [place] today?

## 2023-06-11 DIAGNOSIS — M62.838 MUSCLE SPASM: ICD-10-CM

## 2023-06-19 DIAGNOSIS — G43.809 OTHER MIGRAINE WITHOUT STATUS MIGRAINOSUS, NOT INTRACTABLE: ICD-10-CM

## 2023-06-19 DIAGNOSIS — F43.0 ACUTE REACTION TO STRESS: ICD-10-CM

## 2023-06-19 DIAGNOSIS — G25.81 RESTLESS LEGS SYNDROME (RLS): ICD-10-CM

## 2023-06-19 RX ORDER — BUPROPION HYDROCHLORIDE 300 MG/1
TABLET ORAL
Qty: 90 TABLET | Refills: 0 | Status: SHIPPED | OUTPATIENT
Start: 2023-06-19

## 2023-06-19 RX ORDER — MELOXICAM 15 MG/1
TABLET ORAL
Qty: 90 TABLET | Refills: 0 | Status: SHIPPED | OUTPATIENT
Start: 2023-06-19

## 2023-06-19 RX ORDER — TOPIRAMATE 50 MG/1
TABLET, FILM COATED ORAL
Qty: 90 TABLET | Refills: 0 | Status: SHIPPED | OUTPATIENT
Start: 2023-06-19

## 2023-06-19 RX ORDER — CYCLOBENZAPRINE HCL 10 MG
10 TABLET ORAL EVERY 12 HOURS PRN
Qty: 30 TABLET | Refills: 0 | OUTPATIENT
Start: 2023-06-19

## 2023-07-14 PROBLEM — B96.89 ACUTE BACTERIAL BRONCHITIS: Status: RESOLVED | Noted: 2023-03-29 | Resolved: 2023-07-14

## 2023-07-14 PROBLEM — G44.89 OTHER HEADACHE SYNDROME: Status: RESOLVED | Noted: 2021-08-23 | Resolved: 2023-07-14

## 2023-07-14 PROBLEM — J20.8 ACUTE BACTERIAL BRONCHITIS: Status: RESOLVED | Noted: 2023-03-29 | Resolved: 2023-07-14

## 2023-09-22 DIAGNOSIS — F43.0 ACUTE REACTION TO STRESS: ICD-10-CM

## 2023-09-22 DIAGNOSIS — G43.809 OTHER MIGRAINE WITHOUT STATUS MIGRAINOSUS, NOT INTRACTABLE: ICD-10-CM

## 2023-09-22 DIAGNOSIS — G25.81 RESTLESS LEGS SYNDROME (RLS): ICD-10-CM

## 2023-09-22 RX ORDER — BUPROPION HYDROCHLORIDE 300 MG/1
TABLET ORAL
Qty: 90 TABLET | Refills: 0 | Status: SHIPPED | OUTPATIENT
Start: 2023-09-22

## 2023-09-22 RX ORDER — TOPIRAMATE 50 MG/1
TABLET, FILM COATED ORAL
Qty: 90 TABLET | Refills: 0 | Status: SHIPPED | OUTPATIENT
Start: 2023-09-22

## 2023-09-22 RX ORDER — MELOXICAM 15 MG/1
TABLET ORAL
Qty: 90 TABLET | Refills: 0 | Status: SHIPPED | OUTPATIENT
Start: 2023-09-22

## 2023-11-10 ENCOUNTER — TELEPHONE (OUTPATIENT)
Dept: FAMILY MEDICINE CLINIC | Facility: CLINIC | Age: 58
End: 2023-11-10
Payer: COMMERCIAL

## 2023-11-10 DIAGNOSIS — E04.1 THYROID NODULE: Primary | ICD-10-CM

## 2023-11-10 DIAGNOSIS — E55.9 VITAMIN D DEFICIENCY: ICD-10-CM

## 2023-11-10 DIAGNOSIS — E78.2 MIXED HYPERLIPIDEMIA: Primary | ICD-10-CM

## 2023-11-10 DIAGNOSIS — D64.9 ANEMIA, UNSPECIFIED TYPE: ICD-10-CM

## 2023-11-10 DIAGNOSIS — R53.81 MALAISE AND FATIGUE: ICD-10-CM

## 2023-11-10 DIAGNOSIS — E04.1 THYROID NODULE: ICD-10-CM

## 2023-11-10 DIAGNOSIS — R53.83 MALAISE AND FATIGUE: ICD-10-CM

## 2023-11-10 NOTE — TELEPHONE ENCOUNTER
Appointment scheduled for 12/18 through [facility]    Patient Comments:   Prior to this appointment I need a 1 year follow-up echo for my thyroid (preferably first available AM) so that I can best use my time with Dr. Brock.   I will have fasting labs drawn before this, as well. Thank you.

## 2023-12-04 ENCOUNTER — HOSPITAL ENCOUNTER (OUTPATIENT)
Dept: ULTRASOUND IMAGING | Facility: HOSPITAL | Age: 58
Discharge: HOME | End: 2023-12-04
Admitting: FAMILY MEDICINE
Payer: COMMERCIAL

## 2023-12-04 DIAGNOSIS — E04.1 THYROID NODULE: ICD-10-CM

## 2023-12-04 PROCEDURE — 76536 US EXAM OF HEAD AND NECK: CPT

## 2023-12-07 LAB
25(OH)D3+25(OH)D2 SERPL-MCNC: 23 NG/ML (ref 30–100)
ALBUMIN SERPL-MCNC: 4.4 G/DL (ref 3.8–4.9)
ALBUMIN/GLOB SERPL: 1.8 {RATIO} (ref 1.2–2.2)
ALP SERPL-CCNC: 81 IU/L (ref 44–121)
ALT SERPL-CCNC: 18 IU/L (ref 0–32)
AST SERPL-CCNC: 14 IU/L (ref 0–40)
BASOPHILS # BLD AUTO: 0 X10E3/UL (ref 0–0.2)
BASOPHILS NFR BLD AUTO: 1 %
BILIRUB SERPL-MCNC: 0.3 MG/DL (ref 0–1.2)
BUN SERPL-MCNC: 26 MG/DL (ref 6–24)
BUN/CREAT SERPL: 30 (ref 9–23)
CALCIUM SERPL-MCNC: 10.2 MG/DL (ref 8.7–10.2)
CHLORIDE SERPL-SCNC: 102 MMOL/L (ref 96–106)
CHOLEST SERPL-MCNC: 260 MG/DL (ref 100–199)
CHOLEST/HDLC SERPL: 3.6 RATIO (ref 0–4.4)
CO2 SERPL-SCNC: 25 MMOL/L (ref 20–29)
CREAT SERPL-MCNC: 0.86 MG/DL (ref 0.57–1)
EGFRCR SERPLBLD CKD-EPI 2021: 79 ML/MIN/1.73
EOSINOPHIL # BLD AUTO: 0.1 X10E3/UL (ref 0–0.4)
EOSINOPHIL NFR BLD AUTO: 2 %
ERYTHROCYTE [DISTWIDTH] IN BLOOD BY AUTOMATED COUNT: 13.3 % (ref 11.7–15.4)
GLOBULIN SER CALC-MCNC: 2.4 G/DL (ref 1.5–4.5)
GLUCOSE SERPL-MCNC: 104 MG/DL (ref 70–99)
HCT VFR BLD AUTO: 44.7 % (ref 34–46.6)
HDLC SERPL-MCNC: 72 MG/DL
HGB BLD-MCNC: 14.5 G/DL (ref 11.1–15.9)
IMM GRANULOCYTES # BLD AUTO: 0.1 X10E3/UL (ref 0–0.1)
IMM GRANULOCYTES NFR BLD AUTO: 1 %
LDLC SERPL CALC-MCNC: 173 MG/DL (ref 0–99)
LYMPHOCYTES # BLD AUTO: 2.7 X10E3/UL (ref 0.7–3.1)
LYMPHOCYTES NFR BLD AUTO: 41 %
MCH RBC QN AUTO: 28.5 PG (ref 26.6–33)
MCHC RBC AUTO-ENTMCNC: 32.4 G/DL (ref 31.5–35.7)
MCV RBC AUTO: 88 FL (ref 79–97)
MONOCYTES # BLD AUTO: 0.4 X10E3/UL (ref 0.1–0.9)
MONOCYTES NFR BLD AUTO: 6 %
NEUTROPHILS # BLD AUTO: 3.2 X10E3/UL (ref 1.4–7)
NEUTROPHILS NFR BLD AUTO: 49 %
PLATELET # BLD AUTO: 362 X10E3/UL (ref 150–450)
POTASSIUM SERPL-SCNC: 5.4 MMOL/L (ref 3.5–5.2)
PROT SERPL-MCNC: 6.8 G/DL (ref 6–8.5)
RBC # BLD AUTO: 5.09 X10E6/UL (ref 3.77–5.28)
SODIUM SERPL-SCNC: 141 MMOL/L (ref 134–144)
T3FREE SERPL-MCNC: 3 PG/ML (ref 2–4.4)
T4 FREE SERPL-MCNC: 1.01 NG/DL (ref 0.82–1.77)
TRIGL SERPL-MCNC: 91 MG/DL (ref 0–149)
TSH SERPL DL<=0.005 MIU/L-ACNC: 2.54 UIU/ML (ref 0.45–4.5)
VIT B12 SERPL-MCNC: 516 PG/ML (ref 232–1245)
VLDLC SERPL CALC-MCNC: 15 MG/DL (ref 5–40)
WBC # BLD AUTO: 6.5 X10E3/UL (ref 3.4–10.8)

## 2023-12-15 NOTE — PROGRESS NOTES
Subjective   Joanie Higuera is a 58 y.o. female.     Chief Complaint   Patient presents with    Annual Exam    Hyperlipidemia    Anxiety       The patient is here: to discuss health maintenance and disease prevention.  Last comprehensive physical was on 5/24/2021.  Previous physical was performed by  Eren Brock MD  Overall has: minimal activity with work/home activities, poor appetite, feels well with minor complaints, decreased energy level, is sleeping poorly, and returned to full activity. Nutrition: appropriate balanced diet. Last tetanus shot was unknown. Patient is doing routine self breast exams: occasionally Patient's last Mammogram was 4/9/2019. Patient's last Colonoscopy was 6/24/2011    History of Present Illness  Influenza immunization was not given due to patient refusal      Hyperlipidemia  This is a chronic problem. The current episode started more than 1 year ago. Recent lipid tests were reviewed and are high. Associated symptoms include shortness of breath. Pertinent negatives include no chest pain. Current antihyperlipidemic treatment includes exercise and diet change. The current treatment provides mild improvement of lipids. There are no compliance problems.  Risk factors for coronary artery disease include dyslipidemia.   Anxiety  Presents for follow-up (patient is on wellbutrin she states that she feels she is fine on it but also has her days. ) visit. Symptoms include decreased concentration, depressed mood, dizziness and shortness of breath. Patient reports no chest pain, compulsions, confusion, dry mouth, excessive worry, feeling of choking, hyperventilation, impotence, irritability, malaise, muscle tension, nausea, nervous/anxious behavior, obsessions, palpitations, panic, restlessness or suicidal ideas. Symptoms occur most days. The severity of symptoms is mild and interfering with daily activities. The patient sleeps 5 hours per night. The quality of sleep is poor. Nighttime  awakenings: occasional, several.     Compliance with medications is %.        Recent Hospitalizations:  No hospitalization(s) within the last year..  ccc      I personally reviewed and updated the patient's allergies, medications, problem list, and past medical, surgical, social, and family history. I have reviewed and confirmed the accuracy of the HPI and ROS as documented by the MA/LPN/RN Eren Brock MD    Family History   Problem Relation Age of Onset    Arrhythmia Mother     Colon cancer Mother     Other Mother     Cancer Mother     Stroke Father     Diabetes Father     Parkinsonism Father     Seizures Father     Heart disease Father        Social History     Tobacco Use    Smoking status: Never    Smokeless tobacco: Never   Vaping Use    Vaping Use: Never used   Substance Use Topics    Alcohol use: Yes     Alcohol/week: 3.0 standard drinks of alcohol     Types: 3 Glasses of wine per week     Comment: social    Drug use: No       Past Surgical History:   Procedure Laterality Date    ARTERIOVENOUS FISTULA REPAIR  2015    ENDO REPAIR BY DR KAPLAN    CARDIAC CATHETERIZATION      CARDIAC CATHETERIZATION  08/05/2016    RIGHT HEART CATH PER DR. COLEMAN    CARDIAC CATHETERIZATION N/A 8/5/2016    Procedure: Right Heart Cath;  Surgeon: Holley Coleman MD;  Location:  [facility];  Service:     CARDIAC CATHETERIZATION Bilateral 11/2/2021    Procedure: Right and Left Heart Cath;  Surgeon: Holley Coleman MD;  Location:  [facility];  Service: Cardiology;  Laterality: Bilateral;    CARDIAC PACEMAKER REMOVAL  2012    FOR [facility] INFECTION    FOOT SURGERY Right     HYSTERECTOMY      ovaries remain    PACEMAKER IMPLANTATION  1991 - ORIGINAL    THYROID LOBECTOMY Left        Patient Active Problem List   Diagnosis    A-V fistula    Bursitis of hip    PONV (postoperative nausea and vomiting)    Ischemic colitis    Syncope, cardiogenic    MRSA infection    Thyroid nodule     Arthritis    Dizziness    Migraine without status migrainosus, not intractable    Restless legs syndrome (RLS)    Menopause    Pacemaker infection    Palpitations    Acute reaction to stress    Tachycardia    Allergic rhinitis    Asthma    GERD (gastroesophageal reflux disease)    Heart murmur    Mixed hyperlipidemia    Hypotension    RLS (restless legs syndrome)    Acute adjustment disorder with mixed anxiety and depressed mood    Shift work sleep disorder    Pacemaker    COVID-19 virus infection    Annual visit for general adult medical examination with abnormal findings    Encounter for screening mammogram for malignant neoplasm of breast    Screening for malignant neoplasm of colon    Anxiety    Fear of flying         Current Outpatient Medications:     buPROPion XL (WELLBUTRIN XL) 300 MG 24 hr tablet, TAKE 1 TABLET BY MOUTH EVERY DAY, Disp: 90 tablet, Rfl: 0    gabapentin (NEURONTIN) 300 MG capsule, Take 1 capsule by mouth 3 (Three) Times a Day., Disp: 270 capsule, Rfl: 0    LORazepam (ATIVAN) 0.5 MG tablet, Take 1 tablet by mouth Every 6 (Six) Hours As Needed for Anxiety. for anxiety, Disp: 30 tablet, Rfl: 0    meloxicam (MOBIC) 15 MG tablet, TAKE 1 TABLET BY MOUTH EVERY DAY, Disp: 90 tablet, Rfl: 0    modafinil (PROVIGIL) 200 MG tablet, Take 1 tablet by mouth Daily., Disp: 90 tablet, Rfl: 0    pramipexole (MIRAPEX) 0.125 MG tablet, Take 1 to 2 tablets nightly as needed, Disp: 180 tablet, Rfl: 3    albuterol sulfate  (90 Base) MCG/ACT inhaler, Inhale 2 puffs Every 4 (Four) Hours As Needed for Wheezing. (Patient not taking: Reported on 12/18/2023), Disp: 18 g, Rfl: 2    cyclobenzaprine (FLEXERIL) 10 MG tablet, Take 1 tablet by mouth Every 12 (Twelve) Hours As Needed for Muscle Spasms. (Patient not taking: Reported on 12/18/2023), Disp: 30 tablet, Rfl: 0    Semaglutide,0.25 or 0.5MG/DOS, (Ozempic, 0.25 or 0.5 MG/DOSE,) 2 MG/1.5ML solution pen-injector, Inject 0.25 mg under the skin into the appropriate  "area as directed 1 (One) Time Per Week. [facility]. Initiated 12/18/23 by Dr Brock, Disp: , Rfl:     topiramate (TOPAMAX) 50 MG tablet, TAKE 1 TABLET BY MOUTH EVERYDAY AT BEDTIME (Patient not taking: Reported on 12/18/2023), Disp: 90 tablet, Rfl: 0    Review of Systems   Constitutional:  Negative for chills, diaphoresis and irritability.   HENT:  Positive for congestion, ear pain, rhinorrhea and sore throat. Negative for sneezing.    Eyes:  Negative for visual disturbance.   Respiratory:  Positive for cough and shortness of breath.    Cardiovascular:  Negative for chest pain and palpitations.   Gastrointestinal:  Negative for abdominal pain, nausea and vomiting.   Endocrine: Negative for polydipsia and polyphagia.   Genitourinary:  Negative for impotence.   Musculoskeletal:  Negative for neck stiffness.   Skin:  Negative for color change and pallor.   Neurological:  Positive for dizziness. Negative for seizures, syncope and confusion.   Hematological:  Negative for adenopathy.   Psychiatric/Behavioral:  Positive for decreased concentration and depressed mood. Negative for hallucinations and suicidal ideas. The patient is not nervous/anxious.        I have reviewed and confirmed the accuracy of the ROS as documented by the MA/LPN/RN Eren Brock MD      Objective   /84 (BP Location: Right arm, Patient Position: Sitting, Cuff Size: Adult)   Pulse 83   Temp 97.7 °F (36.5 °C) (Temporal)   Resp 16   Ht 175.3 cm (69.02\")   Wt 91.9 kg (202 lb 9.6 oz)   SpO2 99%   BMI 29.90 kg/m²   BP Readings from Last 3 Encounters:   12/18/23 126/84   05/18/22 130/66   12/03/21 126/98     Wt Readings from Last 3 Encounters:   12/18/23 91.9 kg (202 lb 9.6 oz)   03/29/23 72.6 kg (160 lb)   11/16/22 72.6 kg (160 lb)     Physical Exam  Constitutional:       Appearance: She is well-developed. She is not diaphoretic.   HENT:      Head: Normocephalic.      Right Ear: Tympanic membrane, ear canal and " external ear normal.      Left Ear: Tympanic membrane, ear canal and external ear normal.      Nose: Nose normal.   Eyes:      General: Lids are normal.      Conjunctiva/sclera: Conjunctivae normal.      Pupils: Pupils are equal, round, and reactive to light.   Neck:      Thyroid: No thyromegaly.      Vascular: No carotid bruit or JVD.      Trachea: No tracheal deviation.   Cardiovascular:      Rate and Rhythm: Normal rate and regular rhythm.      Heart sounds: Normal heart sounds. No murmur heard.     No friction rub. No gallop.   Pulmonary:      Effort: Pulmonary effort is normal.      Breath sounds: Normal breath sounds. No stridor. No decreased breath sounds, wheezing or rales.   Abdominal:      General: Bowel sounds are normal. There is no distension.      Palpations: Abdomen is soft. There is no mass.      Tenderness: There is no abdominal tenderness. There is no guarding or rebound.      Hernia: No hernia is present.   Lymphadenopathy:      Head:      Right side of head: No submental, submandibular, tonsillar, preauricular, posterior auricular or occipital adenopathy.      Left side of head: No submental, submandibular, tonsillar, preauricular, posterior auricular or occipital adenopathy.      Cervical: No cervical adenopathy.   Skin:     General: Skin is warm and dry.      Coloration: Skin is not pale.   Neurological:      Mental Status: She is alert and oriented to person, place, and time.      Cranial Nerves: No cranial nerve deficit.      Sensory: No sensory deficit.      Coordination: Coordination normal.      Gait: Gait normal.      Deep Tendon Reflexes: Reflexes are normal and symmetric.         Data / Lab Results:    Hemoglobin A1C   Date Value Ref Range Status   11/02/2021 5.40 4.80 - 5.60 % Final   08/04/2016 5.20 4.00 - 6.00 % Final   12/02/2015 5.4 4.00 - 6.00 % Final     Comment:     The American Diabetes Association recommends maintenance of Hemoglobin  A1C at 7.0% or lower. Goals for Hemoglobin  "A1C reduction may need to be  modified if hypoglycemia is a problem.     10/19/2015 5.9 4.00 - 6.00 % Final     Comment:     The American Diabetes Association recommends maintenance of Hemoglobin  A1C at 7.0% or lower. Goals for Hemoglobin A1C reduction may need to be  modified if hypoglycemia is a problem.       Lab Results   Component Value Date    GLU 73 (L) 02/25/2021     Lab Results   Component Value Date     (H) 12/06/2023     (H) 11/02/2021     (H) 04/29/2019     Lab Results   Component Value Date    CHOL 241 (H) 11/02/2021    CHOL 228 (H) 04/29/2019    CHOL 229 (H) 11/28/2016     Lab Results   Component Value Date    TRIG 91 12/06/2023    TRIG 47 11/02/2021    TRIG 58 04/29/2019     Lab Results   Component Value Date    HDL 72 12/06/2023    HDL 83 (H) 11/02/2021    HDL 94 04/29/2019     No results found for: \"PSA\"  Lab Results   Component Value Date    WBC 6.5 12/06/2023    HGB 14.5 12/06/2023    HCT 44.7 12/06/2023    MCV 88 12/06/2023     12/06/2023     Lab Results   Component Value Date    TSH 2.540 12/06/2023     Lab Results   Component Value Date    GLUCOSE 104 (H) 12/06/2023    BUN 26 (H) 12/06/2023    CREATININE 0.86 12/06/2023    EGFRIFNONA 69 11/02/2021    EGFRIFAFRI >60 02/25/2021    BCR 30 (H) 12/06/2023    K 5.4 (H) 12/06/2023    CO2 25 12/06/2023    CALCIUM 10.2 12/06/2023    PROTENTOTREF 6.8 12/06/2023    ALBUMIN 4.4 12/06/2023    LABIL2 1.8 12/06/2023    AST 14 12/06/2023    ALT 18 12/06/2023     No results found for: \"IVETH\", \"RF\", \"SEDRATE\"   No results found for: \"CRP\"   No results found for: \"IRON\", \"TIBC\", \"FERRITIN\"   Lab Results   Component Value Date    [id number] 12/06/2023        Age-appropriate Screening Schedule:  Refer to the list below for future screening recommendations based on patient's age, sex and/or medical conditions. Orders for these recommended tests are listed in the plan section. The patient has been provided with a written " plan.    Health Maintenance   Topic Date Due    Pneumococcal Vaccine 0-64 (1 - PCV) Never done    TDAP/TD VACCINES (1 - Tdap) Never done    ZOSTER VACCINE (1 of 2) Never done    HEPATITIS C SCREENING  Never done    PAP SMEAR  Never done    MAMMOGRAM  04/09/2021    COLORECTAL CANCER SCREENING  06/24/2021    INFLUENZA VACCINE  Never done    COVID-19 Vaccine (2 - 2023-24 season) 09/01/2023    LIPID PANEL  12/06/2024    ANNUAL PHYSICAL  12/18/2024    BMI FOLLOWUP  12/18/2024           Assessment & Plan      Medications        Problem List         LOS    Physical.  Doing well.  Recommend update tetanus vaccine at pharmacy.  Discussed coated baby aspirin daily, discussed calcium and vitamin D.  Discussed health maintenance, screening test, lifestyle modification.  Followed by OB/GYN, recommend OB/GYN follow-up she states she will consider.  Recommend mammogram she declines currently.  COVID-19 viral upper respiratory infection.    Improved today/making steady progress.  Has had eval per [facility].  Persistent fatigue, intermittent shortness of breath, loss of smell.  Has had benign cardiology eval, recent heart cath negative for recurrence of AV shunt, lungs negative for PE/pneumonia per angiogram this summer, recommend pulmonology referral she declines currently secondary to cost.  Anxiety.   Has done well on Zoloft in the past, recommend restart or try Lexapro/Trintellix she states she will consider.  On Wellbutrin.  Consider counseling.  Good social support.  Follow-up recheck  Shift work disorder.  Improved on nuvigil, continue melatonin.  She recently changed to dayshift.  Vitamin D deficiency.  Replace, follow-up recheck.  Mixed hyperlipidemia.  Worse ldl to 173.  Discussed diet, exercise, lifestyle modification.  Restart regular exercise program.  Add red yeast rice.  Follow up recheck. Plan treamill stess testing, carotid dopplers next 1-2 years.    Lightheadedness/orthostasis.  Improved today.  h/o  hypotension, bradycardia, followed by cardiology / [place], recommend heent eval she declines currently, has had vu in past.  Has had neurology eval per Dr. Hernandez, MRI brain normal 12/19.  AV fistula.  Clinically improved status post embolization [facility].  Plan Recheck echo 1 to 2 years.  Family history CVA.  Recommend carotid doppler UA she states she will consider.  Malaise and fatigue.  possibly 2nd hypotension, rec fludricortisone, midrodrine, she declines 2nd side effects (has had difficulty tolerating this in past, follwed by cardiology)recommend sleep study, + fh sumaya (father), she exercises twice daily  Bradycardia.  Pacemaker out currently/history of pacemaker infection.  Recurrent episode of syncope has cardiology follow-up scheduled/consider ambulatory monitoring.  Increase fluid intake.  Blood work per [place] was reviewed/benign 2/21.  Restless leg syndrome.  Improved on Mirapex.  Cervical disc disease.  MRI 4/20 with ruptured disc C4/5, bulging disc multiple levels.  Recommend neurosurgery eval.  Tinnitus.  HEENT referral scheduled  Emphysematous changes in chest x-ray.  Check pulmonary function testing.  Follow-up recheck  Thyroid nodule.  1.7 cm nodule right thyroid per ultrasound 4/22, increased to 2 cm per us 11/23 tirads 3, repeat ultrasound 1 year.  Has had heent eval / obsrvation recommended.  Small 2 to 3 mm right per CT angiogram neck 2015.  Prior history of right hemithyroidectomy secondary to nodule.    Family history colon cancer.  Her father.  History of benign colonoscopy 2011.  Recommend repeat colonoscopy/GI referral rescheduled.        Diagnoses and all orders for this visit:    1. Annual visit for general adult medical examination with abnormal findings (Primary)  -     POCT urinalysis dipstick, manual  -     CBC & Differential; Future  -     Lipid Panel With / Chol / HDL Ratio; Future  -     TSH; Future  -     Comprehensive Metabolic Panel; Future  -     T4, Free;  Future  -     T3, Free; Future  -     Vitamin B12; Future  -     Vitamin D,25-Hydroxy; Future    2. Mixed hyperlipidemia  -     CBC & Differential; Future  -     Lipid Panel With / Chol / HDL Ratio; Future  -     TSH; Future  -     Comprehensive Metabolic Panel; Future    3. Encounter for screening mammogram for malignant neoplasm of breast    4. Screening for malignant neoplasm of colon    5. Screening for malignant neoplasm of cervix    6. Dysuria  -     Urine Culture - Urine, Urine, Random Void    7. Hematuria, unspecified type  -     Urine Culture - Urine, Urine, Random Void    8. Malaise and fatigue  -     CBC & Differential; Future  -     Lipid Panel With / Chol / HDL Ratio; Future  -     TSH; Future  -     Comprehensive Metabolic Panel; Future  -     T4, Free; Future  -     T3, Free; Future  -     Vitamin B12; Future  -     Vitamin D,25-Hydroxy; Future    9. Thyroid nodule  -     TSH; Future  -     T4, Free; Future  -     T3, Free; Future    10. Vitamin D deficiency  -     Vitamin D,25-Hydroxy; Future    11. Anemia, unspecified type  -     Vitamin B12; Future    12. SOB (shortness of breath)  -     modafinil (PROVIGIL) 200 MG tablet; Take 1 tablet by mouth Daily.  Dispense: 90 tablet; Refill: 0        BMI is >= 25 and <30. (Overweight) The following options were offered after discussion;: exercise counseling/recommendations and nutrition counseling/recommendations        Expected course, medications, and adverse effects discussed.  Call or return if worsening or persistent symptoms.  I wore protective equipment throughout this patient encounter including a mask, gloves, and eye protection.  Hand hygiene was performed before donning protective equipment and after removal when leaving the room. The complete contents of the Assessment and Plan and Data / Lab Results as documented above have been reviewed and addressed by myself with the patient today as part of an ongoing evaluation / treatment plan.  If some of  the documentation has been copied from a previous note and is unchanged it indicates that this problem / plan has been assessed today but is stable from a previous visit and no changes have been recommended.  The separate E/M service provided today is significant, medically necessary, and separately identifiable.

## 2023-12-18 ENCOUNTER — OFFICE VISIT (OUTPATIENT)
Dept: FAMILY MEDICINE CLINIC | Facility: CLINIC | Age: 58
End: 2023-12-18
Payer: COMMERCIAL

## 2023-12-18 ENCOUNTER — TELEPHONE (OUTPATIENT)
Dept: FAMILY MEDICINE CLINIC | Facility: CLINIC | Age: 58
End: 2023-12-18

## 2023-12-18 VITALS
HEART RATE: 83 BPM | RESPIRATION RATE: 16 BRPM | OXYGEN SATURATION: 99 % | WEIGHT: 202.6 LBS | BODY MASS INDEX: 30.01 KG/M2 | SYSTOLIC BLOOD PRESSURE: 126 MMHG | TEMPERATURE: 97.7 F | HEIGHT: 69 IN | DIASTOLIC BLOOD PRESSURE: 84 MMHG

## 2023-12-18 DIAGNOSIS — R06.02 SOB (SHORTNESS OF BREATH): ICD-10-CM

## 2023-12-18 DIAGNOSIS — R30.0 DYSURIA: ICD-10-CM

## 2023-12-18 DIAGNOSIS — Z12.31 ENCOUNTER FOR SCREENING MAMMOGRAM FOR MALIGNANT NEOPLASM OF BREAST: ICD-10-CM

## 2023-12-18 DIAGNOSIS — D64.9 ANEMIA, UNSPECIFIED TYPE: ICD-10-CM

## 2023-12-18 DIAGNOSIS — R53.83 MALAISE AND FATIGUE: ICD-10-CM

## 2023-12-18 DIAGNOSIS — E55.9 VITAMIN D DEFICIENCY: ICD-10-CM

## 2023-12-18 DIAGNOSIS — E04.1 THYROID NODULE: ICD-10-CM

## 2023-12-18 DIAGNOSIS — E78.2 MIXED HYPERLIPIDEMIA: ICD-10-CM

## 2023-12-18 DIAGNOSIS — R53.81 MALAISE AND FATIGUE: ICD-10-CM

## 2023-12-18 DIAGNOSIS — R31.9 HEMATURIA, UNSPECIFIED TYPE: ICD-10-CM

## 2023-12-18 DIAGNOSIS — Z12.4 SCREENING FOR MALIGNANT NEOPLASM OF CERVIX: ICD-10-CM

## 2023-12-18 DIAGNOSIS — Z12.11 SCREENING FOR MALIGNANT NEOPLASM OF COLON: ICD-10-CM

## 2023-12-18 DIAGNOSIS — Z00.01 ANNUAL VISIT FOR GENERAL ADULT MEDICAL EXAMINATION WITH ABNORMAL FINDINGS: Primary | ICD-10-CM

## 2023-12-18 LAB
BILIRUB BLD-MCNC: NEGATIVE MG/DL
CLARITY, POC: CLEAR
COLOR UR: YELLOW
GLUCOSE UR STRIP-MCNC: NEGATIVE MG/DL
KETONES UR QL: NEGATIVE
LEUKOCYTE EST, POC: ABNORMAL
NITRITE UR-MCNC: NEGATIVE MG/ML
PH UR: 6 [PH] (ref 5–8)
PROT UR STRIP-MCNC: NEGATIVE MG/DL
RBC # UR STRIP: ABNORMAL /UL
SP GR UR: 1.01 (ref 1–1.03)
UROBILINOGEN UR QL: NORMAL

## 2023-12-18 RX ORDER — SEMAGLUTIDE 1.34 MG/ML
0.25 INJECTION, SOLUTION SUBCUTANEOUS WEEKLY
COMMUNITY

## 2023-12-18 RX ORDER — MODAFINIL 200 MG/1
200 TABLET ORAL DAILY
Qty: 90 TABLET | Refills: 0 | Status: SHIPPED | OUTPATIENT
Start: 2023-12-18

## 2023-12-19 ENCOUNTER — TELEPHONE (OUTPATIENT)
Dept: FAMILY MEDICINE CLINIC | Facility: CLINIC | Age: 58
End: 2023-12-19
Payer: COMMERCIAL

## 2023-12-20 LAB
BACTERIA UR CULT: NO GROWTH
BACTERIA UR CULT: NORMAL

## 2023-12-22 DIAGNOSIS — G25.81 RESTLESS LEGS SYNDROME (RLS): ICD-10-CM

## 2023-12-22 DIAGNOSIS — G43.809 OTHER MIGRAINE WITHOUT STATUS MIGRAINOSUS, NOT INTRACTABLE: ICD-10-CM

## 2023-12-22 RX ORDER — TOPIRAMATE 50 MG/1
TABLET, FILM COATED ORAL
Qty: 90 TABLET | Refills: 0 | Status: SHIPPED | OUTPATIENT
Start: 2023-12-22

## 2023-12-22 RX ORDER — MELOXICAM 15 MG/1
TABLET ORAL
Qty: 90 TABLET | Refills: 0 | Status: SHIPPED | OUTPATIENT
Start: 2023-12-22

## 2023-12-31 DIAGNOSIS — F43.0 ACUTE REACTION TO STRESS: ICD-10-CM

## 2024-01-02 RX ORDER — BUPROPION HYDROCHLORIDE 300 MG/1
TABLET ORAL
Qty: 90 TABLET | Refills: 0 | Status: SHIPPED | OUTPATIENT
Start: 2024-01-02

## 2024-01-04 ENCOUNTER — TELEPHONE (OUTPATIENT)
Dept: FAMILY MEDICINE CLINIC | Facility: CLINIC | Age: 59
End: 2024-01-04
Payer: COMMERCIAL

## 2024-01-04 ENCOUNTER — PATIENT MESSAGE (OUTPATIENT)
Dept: FAMILY MEDICINE CLINIC | Facility: CLINIC | Age: 59
End: 2024-01-04
Payer: COMMERCIAL

## 2024-01-04 NOTE — TELEPHONE ENCOUNTER
Patient is asking for a refill on Ozempic through [facility]. Currently taking 0.25mg weekly with no issues or side effects. Patient was started on Ozempic for Obesity on 12/18/2023. Patient is asking if it is okay to increase to 0.5mg weekly on her refill.

## 2024-01-28 DIAGNOSIS — G43.809 OTHER MIGRAINE WITHOUT STATUS MIGRAINOSUS, NOT INTRACTABLE: ICD-10-CM

## 2024-01-28 DIAGNOSIS — G25.81 RESTLESS LEGS SYNDROME (RLS): ICD-10-CM

## 2024-01-31 RX ORDER — GABAPENTIN 300 MG/1
300 CAPSULE ORAL 3 TIMES DAILY
Qty: 270 CAPSULE | Refills: 0 | Status: SHIPPED | OUTPATIENT
Start: 2024-01-31

## 2024-03-17 DIAGNOSIS — G25.81 RESTLESS LEGS SYNDROME (RLS): ICD-10-CM

## 2024-03-22 RX ORDER — PRAMIPEXOLE DIHYDROCHLORIDE 0.12 MG/1
TABLET ORAL
Qty: 180 TABLET | Refills: 2 | Status: SHIPPED | OUTPATIENT
Start: 2024-03-22

## 2024-03-22 RX ORDER — MELOXICAM 15 MG/1
TABLET ORAL
Qty: 90 TABLET | Refills: 0 | Status: SHIPPED | OUTPATIENT
Start: 2024-03-22

## 2024-04-11 DIAGNOSIS — F43.0 ACUTE REACTION TO STRESS: ICD-10-CM

## 2024-04-12 RX ORDER — BUPROPION HYDROCHLORIDE 300 MG/1
TABLET ORAL
Qty: 90 TABLET | Refills: 0 | Status: SHIPPED | OUTPATIENT
Start: 2024-04-12

## 2024-04-17 DIAGNOSIS — G43.809 OTHER MIGRAINE WITHOUT STATUS MIGRAINOSUS, NOT INTRACTABLE: ICD-10-CM

## 2024-04-17 DIAGNOSIS — G25.81 RESTLESS LEGS SYNDROME (RLS): ICD-10-CM

## 2024-04-17 RX ORDER — GABAPENTIN 300 MG/1
300 CAPSULE ORAL 3 TIMES DAILY
Qty: 270 CAPSULE | Refills: 0 | Status: SHIPPED | OUTPATIENT
Start: 2024-04-17

## 2024-05-22 DIAGNOSIS — G43.809 OTHER MIGRAINE WITHOUT STATUS MIGRAINOSUS, NOT INTRACTABLE: ICD-10-CM

## 2024-05-22 DIAGNOSIS — G25.81 RESTLESS LEGS SYNDROME (RLS): ICD-10-CM

## 2024-05-23 RX ORDER — SEMAGLUTIDE 1.34 MG/ML
0.25 INJECTION, SOLUTION SUBCUTANEOUS WEEKLY
OUTPATIENT
Start: 2024-05-23

## 2024-05-28 RX ORDER — GABAPENTIN 300 MG/1
300 CAPSULE ORAL 3 TIMES DAILY
Qty: 270 CAPSULE | Refills: 0 | Status: SHIPPED | OUTPATIENT
Start: 2024-05-28

## 2024-06-22 DIAGNOSIS — G25.81 RESTLESS LEGS SYNDROME (RLS): ICD-10-CM

## 2024-06-24 RX ORDER — MELOXICAM 15 MG/1
TABLET ORAL
Qty: 90 TABLET | Refills: 0 | Status: SHIPPED | OUTPATIENT
Start: 2024-06-24 | End: 2024-06-26 | Stop reason: SDUPTHER

## 2024-06-24 RX ORDER — SEMAGLUTIDE 1.34 MG/ML
0.25 INJECTION, SOLUTION SUBCUTANEOUS WEEKLY
OUTPATIENT
Start: 2024-06-24

## 2024-06-26 DIAGNOSIS — G25.81 RESTLESS LEGS SYNDROME (RLS): ICD-10-CM

## 2024-06-26 RX ORDER — MELOXICAM 15 MG/1
TABLET ORAL
Qty: 90 TABLET | Refills: 0 | OUTPATIENT
Start: 2024-06-26

## 2024-06-26 RX ORDER — MELOXICAM 15 MG/1
15 TABLET ORAL DAILY
Qty: 90 TABLET | Refills: 0 | Status: SHIPPED | OUTPATIENT
Start: 2024-06-26

## 2024-07-13 DIAGNOSIS — F43.0 ACUTE REACTION TO STRESS: ICD-10-CM

## 2024-07-15 RX ORDER — BUPROPION HYDROCHLORIDE 300 MG/1
TABLET ORAL
Qty: 90 TABLET | Refills: 0 | Status: SHIPPED | OUTPATIENT
Start: 2024-07-15

## 2024-09-16 NOTE — TELEPHONE ENCOUNTER
Called pharmacy and let them know.   Please take a list of all of your medications and discharge paperwork with you to all of your follow-up medical visits. Please take all of your medications as directed. Please call your family doctor or return to the ER if you have increased shortness of breath, chest pain, fevers, chills, nausea, vomiting, diarrhea, or any other worsening symptoms.

## 2024-10-01 DIAGNOSIS — E66.3 OVERWEIGHT WITH BODY MASS INDEX (BMI) OF 29 TO 29.9 IN ADULT: Primary | ICD-10-CM

## 2024-10-01 RX ORDER — SEMAGLUTIDE 1.34 MG/ML
0.5 INJECTION, SOLUTION SUBCUTANEOUS WEEKLY
Qty: 2 ML | Refills: 1 | Status: SHIPPED | OUTPATIENT
Start: 2024-10-01

## 2024-10-17 DIAGNOSIS — G25.81 RESTLESS LEGS SYNDROME (RLS): ICD-10-CM

## 2024-10-17 DIAGNOSIS — G43.809 OTHER MIGRAINE WITHOUT STATUS MIGRAINOSUS, NOT INTRACTABLE: ICD-10-CM

## 2024-10-18 RX ORDER — GABAPENTIN 300 MG/1
300 CAPSULE ORAL 3 TIMES DAILY
Qty: 270 CAPSULE | Refills: 0 | OUTPATIENT
Start: 2024-10-18

## 2024-10-21 DIAGNOSIS — G43.809 OTHER MIGRAINE WITHOUT STATUS MIGRAINOSUS, NOT INTRACTABLE: ICD-10-CM

## 2024-10-21 DIAGNOSIS — G25.81 RESTLESS LEGS SYNDROME (RLS): ICD-10-CM

## 2024-10-22 RX ORDER — GABAPENTIN 300 MG/1
300 CAPSULE ORAL 3 TIMES DAILY
Qty: 270 CAPSULE | Refills: 0 | OUTPATIENT
Start: 2024-10-22

## 2024-12-03 DIAGNOSIS — R06.02 SOB (SHORTNESS OF BREATH): ICD-10-CM

## 2024-12-03 DIAGNOSIS — G43.809 OTHER MIGRAINE WITHOUT STATUS MIGRAINOSUS, NOT INTRACTABLE: ICD-10-CM

## 2024-12-03 DIAGNOSIS — G25.81 RESTLESS LEGS SYNDROME (RLS): ICD-10-CM

## 2024-12-03 DIAGNOSIS — E66.3 OVERWEIGHT WITH BODY MASS INDEX (BMI) OF 29 TO 29.9 IN ADULT: ICD-10-CM

## 2024-12-03 RX ORDER — SEMAGLUTIDE 1.34 MG/ML
0.5 INJECTION, SOLUTION SUBCUTANEOUS WEEKLY
Qty: 2 ML | Refills: 1 | Status: SHIPPED | OUTPATIENT
Start: 2024-12-03

## 2024-12-05 RX ORDER — GABAPENTIN 300 MG/1
300 CAPSULE ORAL 3 TIMES DAILY
Qty: 270 CAPSULE | Refills: 0 | OUTPATIENT
Start: 2024-12-05

## 2024-12-05 RX ORDER — MODAFINIL 200 MG/1
200 TABLET ORAL DAILY
Qty: 90 TABLET | Refills: 0 | OUTPATIENT
Start: 2024-12-05

## 2024-12-26 ENCOUNTER — PATIENT MESSAGE (OUTPATIENT)
Dept: FAMILY MEDICINE CLINIC | Facility: CLINIC | Age: 59
End: 2024-12-26
Payer: COMMERCIAL

## 2024-12-27 ENCOUNTER — OFFICE VISIT (OUTPATIENT)
Dept: FAMILY MEDICINE CLINIC | Facility: CLINIC | Age: 59
End: 2024-12-27
Payer: COMMERCIAL

## 2024-12-27 VITALS
HEIGHT: 69 IN | BODY MASS INDEX: 25.48 KG/M2 | DIASTOLIC BLOOD PRESSURE: 84 MMHG | OXYGEN SATURATION: 98 % | TEMPERATURE: 97.3 F | RESPIRATION RATE: 18 BRPM | HEART RATE: 101 BPM | SYSTOLIC BLOOD PRESSURE: 122 MMHG | WEIGHT: 172 LBS

## 2024-12-27 DIAGNOSIS — S61.210D LACERATION OF RIGHT INDEX FINGER WITHOUT FOREIGN BODY WITHOUT DAMAGE TO NAIL, SUBSEQUENT ENCOUNTER: Primary | ICD-10-CM

## 2024-12-27 PROCEDURE — 90471 IMMUNIZATION ADMIN: CPT | Performed by: STUDENT IN AN ORGANIZED HEALTH CARE EDUCATION/TRAINING PROGRAM

## 2024-12-27 PROCEDURE — 99214 OFFICE O/P EST MOD 30 MIN: CPT | Performed by: STUDENT IN AN ORGANIZED HEALTH CARE EDUCATION/TRAINING PROGRAM

## 2024-12-27 PROCEDURE — 90715 TDAP VACCINE 7 YRS/> IM: CPT | Performed by: STUDENT IN AN ORGANIZED HEALTH CARE EDUCATION/TRAINING PROGRAM

## 2024-12-27 RX ORDER — CEPHALEXIN 500 MG/1
500 CAPSULE ORAL 4 TIMES DAILY
COMMUNITY
Start: 2024-12-25 | End: 2025-01-04

## 2024-12-27 NOTE — PROGRESS NOTES
Subjective   Joanie Higuera is a 59 y.o. female.   Chief Complaint   Patient presents with    ER Follow up    Laceration       History of Present Illness     Joanie was seen at [facility] on 12/25/2024.   She was seen for right index finger avulsion/laceration from a mandolin.   Medication changes: cephalexin 500mg .    Course of Events  -Patient presented to [facility] ER with an injury to her right index finger.  The tip was shaved off while using a mandolin slicer.  -Patient was offered a Tdap vaccine because she was not up-to-date but she declined.  - ER placed surgical and non stick dressing on avulsion.    - She was sent in a 10-day course of Keflex 500 mg 4 times daily    Today  -Patient's gauze is hardened and attached to her fingertip.  If she tries to pull it, she is starting to tear the skin            The following portions of the patient's history were reviewed and updated as appropriate: allergies, current medications, past family history, past medical history, past social history, past surgical history, and problem list.    Patient Active Problem List   Diagnosis    A-V fistula    Bursitis of hip    PONV (postoperative nausea and vomiting)    Ischemic colitis    Syncope, cardiogenic    MRSA infection    Thyroid nodule    Arthritis    Dizziness    Migraine without status migrainosus, not intractable    Restless legs syndrome (RLS)    Menopause    Pacemaker infection    Palpitations    Acute reaction to stress    Tachycardia    Allergic rhinitis    Asthma    GERD (gastroesophageal reflux disease)    Heart murmur    Mixed hyperlipidemia    Hypotension    RLS (restless legs syndrome)    Acute adjustment disorder with mixed anxiety and depressed mood    Shift work sleep disorder    Pacemaker    COVID-19 virus infection    Annual visit for general adult medical examination with abnormal findings    Encounter for screening mammogram for malignant neoplasm of breast    Screening for  malignant neoplasm of colon    Anxiety    Fear of flying       Current Outpatient Medications on File Prior to Visit   Medication Sig Dispense Refill    buPROPion XL (WELLBUTRIN XL) 300 MG 24 hr tablet TAKE 1 TABLET BY MOUTH EVERY DAY 90 tablet 0    cephalexin (KEFLEX) 500 MG capsule Take 1 capsule by mouth 4 (Four) Times a Day.      gabapentin (NEURONTIN) 300 MG capsule Take 1 capsule by mouth 3 (Three) Times a Day. 270 capsule 0    LORazepam (ATIVAN) 0.5 MG tablet Take 1 tablet by mouth Every 6 (Six) Hours As Needed for Anxiety. for anxiety 30 tablet 0    meloxicam (MOBIC) 15 MG tablet Take 1 tablet by mouth Daily. 90 tablet 0    modafinil (PROVIGIL) 200 MG tablet Take 1 tablet by mouth Daily. 90 tablet 0    pramipexole (MIRAPEX) 0.125 MG tablet TAKE 1 TO 2 TABLETS NIGHTLY AS NEEDED 180 tablet 2    Semaglutide,0.25 or 0.5MG/DOS, (Ozempic, 0.25 or 0.5 MG/DOSE,) 2 MG/1.5ML solution pen-injector Inject 0.5 mg under the skin into the appropriate area as directed 1 (One) Time Per Week. [facility] pharmacy. Initiated 12/18/23 by Dr Brock 2 mL 1    albuterol sulfate  (90 Base) MCG/ACT inhaler Inhale 2 puffs Every 4 (Four) Hours As Needed for Wheezing. (Patient not taking: Reported on 12/27/2024) 18 g 2    cyclobenzaprine (FLEXERIL) 10 MG tablet Take 1 tablet by mouth Every 12 (Twelve) Hours As Needed for Muscle Spasms. (Patient not taking: Reported on 12/27/2024) 30 tablet 0    topiramate (TOPAMAX) 50 MG tablet TAKE 1 TABLET BY MOUTH EVERYDAY AT BEDTIME (Patient not taking: Reported on 12/27/2024) 90 tablet 0     No current facility-administered medications on file prior to visit.     Current outpatient and discharge medications have been reconciled for the patient.  Reviewed by: Milagros Wade, DO      No Known Allergies      Objective   Visit Vitals  /84 (BP Location: Left arm, Patient Position: Sitting, Cuff Size: Adult)   Pulse 101   Temp 97.3 °F (36.3 °C) (Temporal)   Resp 18   Ht 175.3  "cm (69.02\")   Wt 78 kg (172 lb)   SpO2 98% Comment: room air   BMI 25.39 kg/m²       Physical Exam  HENT:      Head: Normocephalic and atraumatic.   Eyes:      Conjunctiva/sclera: Conjunctivae normal.   Skin:     Comments: - Right index finger: Once all gauze and bandages were removed, patient did not have excessive bleeding but had minimal bleeding, open wound and surrounding skin did not look infected, no signs of ischemia   Neurological:      General: No focal deficit present.      Mental Status: She is alert and oriented to person, place, and time.   Psychiatric:         Mood and Affect: Mood normal.         Behavior: Behavior normal.                              Diagnoses and all orders for this visit:    1. Laceration of right index finger without foreign body without damage to nail, subsequent encounter (Primary)  -     Tdap Vaccine Greater Than or Equal To 8yo IM  -Took about 3 rounds of soaking the bandage and sterile water for about 5 to 7 minutes at a time with progressive removal of the bandaging with a suture kit until it could finally be removed  -Wound appeared healthy, some bleeding but not excessive bleeding at the area where the skin had been removed  -Recommended she leave this open to air so it can dry and seal over.  Once sealed, recommended getting dry gauze and putting it around the fingertip with some tape at the bottom more for physical protection from being bumped and ripped open again  -Encouraged her to continue and finish her course of Keflex       I spent 36 minutes caring for Joanie on this date of service. This time includes time spent by me in the following activities: counseling and educating the patient/family/caregiver, documenting information in the medical record, and progressively removing gauze      Follow Up  - prn    Expected course, medications, and adverse effects discussed as appropriate.  Call or return if worsening or persistent symptoms. Hand hygiene was performed " before donning protective equipment and after removal when leaving the room.    This document is intended for medical expert use only. Reading of this document by patients and/or patient's family without participating medical staff guidance may result in misinterpretation and unintended morbidity. Any interpretation of such data is the responsibility of the patient and/or family member responsible for the patient in concert with their primary or specialist providers, not to be left for sources of online searches such as [employer], [employer] or similar queries. Relying on these approaches to knowledge may result in misinterpretation, misguided goals of care and even death should patients or family members try recommendations outside of the realm of professional medical care.

## 2025-01-22 DIAGNOSIS — G25.81 RESTLESS LEGS SYNDROME (RLS): ICD-10-CM

## 2025-01-22 RX ORDER — PRAMIPEXOLE DIHYDROCHLORIDE 0.12 MG/1
TABLET ORAL
Qty: 180 TABLET | Refills: 2 | Status: SHIPPED | OUTPATIENT
Start: 2025-01-22

## 2025-01-29 DIAGNOSIS — G25.81 RESTLESS LEGS SYNDROME (RLS): ICD-10-CM

## 2025-01-31 RX ORDER — MELOXICAM 15 MG/1
15 TABLET ORAL DAILY
Qty: 90 TABLET | Refills: 1 | Status: SHIPPED | OUTPATIENT
Start: 2025-01-31 | End: 2025-02-03 | Stop reason: SDUPTHER

## 2025-01-31 NOTE — PROGRESS NOTES
Subjective   Joanie Higuera is a 59 y.o. female.     Chief Complaint   Patient presents with    Annual Exam    Hyperlipidemia    Anxiety       The patient is here: to discuss health maintenance and disease prevention to follow up on multiple medical problems.  Last comprehensive physical was on 12/18/2023.  Previous physical was performed by  Eren Brock MD  Overall has: moderate activity with work/home activities, exercises 2 - 3 times per week, good appetite, feels well with minor complaints, decreased energy level, and is sleeping well. Nutrition: eating a variety of foods. Last tetanus shot was  12/27/2024 . Patient is doing routine self breast exams: every 2 - 3 months    Hyperlipidemia  This is a chronic problem. The current episode started more than 1 year ago. Recent lipid tests were reviewed and are high. Pertinent negatives include no chest pain, focal weakness, leg pain, myalgias or shortness of breath. Current antihyperlipidemic treatment includes exercise and diet change. The current treatment provides mild improvement of lipids. There are no compliance problems.  Risk factors for coronary artery disease include dyslipidemia.   Anxiety  Presents for follow-up (patient is on wellbutrin she states that she feels she is fine on it but also has her days. ) visit. Patient reports no chest pain, compulsions, confusion, decreased concentration, depressed mood, dizziness, dry mouth, excessive worry, feeling of choking, hyperventilation, impotence, irritability, malaise, muscle tension, nausea, nervous/anxious behavior, obsessions, palpitations, panic, restlessness, shortness of breath or suicidal ideas. Symptoms occur most days. The severity of symptoms is mild and interfering with daily activities. The patient sleeps 7 hours per night. The quality of sleep is fair. Nighttime awakenings: occasional.     Compliance with medications is %.        Recent Hospitalizations:  No hospitalization(s) within  the last year..  ccc      I personally reviewed and updated the patient's allergies, medications, problem list, and past medical, surgical, social, and family history. I have reviewed and confirmed the accuracy of the HPI and ROS as documented by the MA/LPN/RN Eren Brock MD    Family History   Problem Relation Age of Onset    Arrhythmia Mother     Colon cancer Mother     Other Mother     Cancer Mother     Stroke Father     Diabetes Father     Parkinsonism Father     Seizures Father     Heart disease Father        Social History     Tobacco Use    Smoking status: Never    Smokeless tobacco: Never   Vaping Use    Vaping status: Never Used   Substance Use Topics    Alcohol use: Yes     Alcohol/week: 3.0 standard drinks of alcohol     Types: 3 Glasses of wine per week     Comment: social    Drug use: No       Past Surgical History:   Procedure Laterality Date    ARTERIOVENOUS FISTULA REPAIR  2015    ENDO REPAIR BY DR KAPLAN    CARDIAC CATHETERIZATION      CARDIAC CATHETERIZATION  08/05/2016    RIGHT HEART CATH PER DR. COLEMAN    CARDIAC CATHETERIZATION N/A 8/5/2016    Procedure: Right Heart Cath;  Surgeon: Holley Coleman MD;  Location:  [facility];  Service:     CARDIAC CATHETERIZATION Bilateral 11/2/2021    Procedure: Right and Left Heart Cath;  Surgeon: Holley Coleman MD;  Location:  [facility];  Service: Cardiology;  Laterality: Bilateral;    CARDIAC PACEMAKER REMOVAL  2012    FOR [facility] INFECTION    FOOT SURGERY Right     HYSTERECTOMY      ovaries remain    PACEMAKER IMPLANTATION  1991 - ORIGINAL    THYROID LOBECTOMY Left        Patient Active Problem List   Diagnosis    A-V fistula    Bursitis of hip    PONV (postoperative nausea and vomiting)    Ischemic colitis    Syncope, cardiogenic    MRSA infection    Thyroid nodule    Arthritis    Dizziness    Migraine without status migrainosus, not intractable    Restless legs syndrome (RLS)    Menopause     Pacemaker infection    Palpitations    Acute reaction to stress    Tachycardia    Allergic rhinitis    Asthma    GERD (gastroesophageal reflux disease)    Heart murmur    Mixed hyperlipidemia    Hypotension    RLS (restless legs syndrome)    Acute adjustment disorder with mixed anxiety and depressed mood    Shift work sleep disorder    Pacemaker    COVID-19 virus infection    Annual visit for general adult medical examination with abnormal findings    Encounter for screening mammogram for malignant neoplasm of breast    Screening for malignant neoplasm of colon    Anxiety    Fear of flying         Current Outpatient Medications:     buPROPion XL (WELLBUTRIN XL) 300 MG 24 hr tablet, Take 1 tablet by mouth Daily., Disp: 90 tablet, Rfl: 3    gabapentin (NEURONTIN) 300 MG capsule, Take 1 capsule by mouth 3 (Three) Times a Day., Disp: 270 capsule, Rfl: 0    LORazepam (ATIVAN) 0.5 MG tablet, Take 1 tablet by mouth Every 6 (Six) Hours As Needed for Anxiety. for anxiety, Disp: 30 tablet, Rfl: 0    meloxicam (MOBIC) 15 MG tablet, Take 1 tablet by mouth Daily. As needed, Disp: 90 tablet, Rfl: 3    modafinil (PROVIGIL) 200 MG tablet, Take 1 tablet by mouth Daily., Disp: 90 tablet, Rfl: 0    pramipexole (MIRAPEX) 0.125 MG tablet, TAKE 1 TO 2 TABLETS BY MOUTH NIGHTLY AS NEEDED, Disp: 180 tablet, Rfl: 3    Semaglutide,0.25 or 0.5MG/DOS, (Ozempic, 0.25 or 0.5 MG/DOSE,) 2 MG/1.5ML solution pen-injector, Inject 0.5 mg under the skin into the appropriate area as directed 1 (One) Time Per Week. [facility]. Initiated 12/18/23 by Dr Brock, Disp: 2 mL, Rfl: 1    albuterol sulfate  (90 Base) MCG/ACT inhaler, Inhale 2 puffs Every 4 (Four) Hours As Needed for Wheezing. (Patient not taking: Reported on 12/18/2023), Disp: 18 g, Rfl: 2    cyclobenzaprine (FLEXERIL) 10 MG tablet, Take 1 tablet by mouth Every 12 (Twelve) Hours As Needed for Muscle Spasms. (Patient not taking: Reported on 12/18/2023), Disp: 30  "tablet, Rfl: 0    topiramate (TOPAMAX) 50 MG tablet, TAKE 1 TABLET BY MOUTH EVERYDAY AT BEDTIME (Patient not taking: Reported on 2/3/2025), Disp: 90 tablet, Rfl: 0    Review of Systems   Constitutional:  Negative for chills, diaphoresis and irritability.   HENT:  Negative for trouble swallowing and voice change.    Eyes:  Negative for visual disturbance.   Respiratory:  Negative for shortness of breath.    Cardiovascular:  Negative for chest pain and palpitations.   Gastrointestinal:  Negative for abdominal pain and nausea.   Endocrine: Negative for polydipsia and polyphagia.   Genitourinary:  Negative for hematuria and impotence.   Musculoskeletal:  Negative for myalgias and neck stiffness.   Skin:  Negative for color change and pallor.   Allergic/Immunologic: Negative for immunocompromised state.   Neurological:  Negative for dizziness, focal weakness, seizures, syncope and confusion.   Hematological:  Negative for adenopathy.   Psychiatric/Behavioral:  Negative for decreased concentration, hallucinations, sleep disturbance, suicidal ideas and depressed mood. The patient is not nervous/anxious.        I have reviewed and confirmed the accuracy of the ROS as documented by the MA/LPN/RN Eren Brock MD      Objective   /82 (BP Location: Right arm, Patient Position: Sitting, Cuff Size: Adult)   Pulse 92   Temp 98 °F (36.7 °C) (Temporal)   Resp 18   Ht 175.3 cm (69.02\")   Wt 81.2 kg (179 lb)   SpO2 93% Comment: room air  BMI 26.42 kg/m²   BP Readings from Last 3 Encounters:   02/03/25 118/82   12/27/24 122/84   12/18/23 126/84     Wt Readings from Last 3 Encounters:   02/03/25 81.2 kg (179 lb)   12/27/24 78 kg (172 lb)   12/18/23 91.9 kg (202 lb 9.6 oz)     Physical Exam  Constitutional:       Appearance: Normal appearance. She is well-developed. She is not diaphoretic.   HENT:      Head: Normocephalic and atraumatic.      Right Ear: Hearing, tympanic membrane, ear canal and external ear normal.      " Left Ear: Hearing, tympanic membrane, ear canal and external ear normal.      Nose: Nose normal. No mucosal edema or congestion.      Right Sinus: No maxillary sinus tenderness or frontal sinus tenderness.      Left Sinus: No maxillary sinus tenderness or frontal sinus tenderness.      Mouth/Throat:      Mouth: Mucous membranes are moist. No oral lesions.      Pharynx: Uvula midline. No oropharyngeal exudate or posterior oropharyngeal erythema.      Tonsils: No tonsillar exudate.   Eyes:      General: Lids are normal.      Extraocular Movements: Extraocular movements intact.      Conjunctiva/sclera: Conjunctivae normal.      Pupils: Pupils are equal, round, and reactive to light.   Neck:      Thyroid: No thyromegaly.      Vascular: No carotid bruit or JVD.      Trachea: No tracheal deviation.   Cardiovascular:      Rate and Rhythm: Normal rate and regular rhythm.      Heart sounds: Normal heart sounds. No murmur heard.     No friction rub. No gallop.   Pulmonary:      Effort: Pulmonary effort is normal.      Breath sounds: Normal breath sounds. No stridor. No decreased breath sounds, wheezing or rales.   Abdominal:      General: Bowel sounds are normal. There is no distension.      Palpations: Abdomen is soft. There is no mass.      Tenderness: There is no abdominal tenderness. There is no guarding or rebound.      Hernia: No hernia is present.   Lymphadenopathy:      Head:      Right side of head: No submental, submandibular, tonsillar, preauricular, posterior auricular or occipital adenopathy.      Left side of head: No submental, submandibular, tonsillar, preauricular, posterior auricular or occipital adenopathy.      Cervical: No cervical adenopathy.   Skin:     General: Skin is warm and dry.      Coloration: Skin is not pale.   Neurological:      Mental Status: She is alert and oriented to person, place, and time.      Cranial Nerves: No cranial nerve deficit.      Sensory: No sensory deficit.       "Coordination: Coordination normal.      Gait: Gait normal.      Deep Tendon Reflexes: Reflexes are normal and symmetric.         Data / Lab Results:    Hemoglobin A1C   Date Value Ref Range Status   11/02/2021 5.40 4.80 - 5.60 % Final   08/04/2016 5.20 4.00 - 6.00 % Final   12/02/2015 5.4 4.00 - 6.00 % Final     Comment:     The American Diabetes Association recommends maintenance of Hemoglobin  A1C at 7.0% or lower. Goals for Hemoglobin A1C reduction may need to be  modified if hypoglycemia is a problem.     10/19/2015 5.9 4.00 - 6.00 % Final     Comment:     The American Diabetes Association recommends maintenance of Hemoglobin  A1C at 7.0% or lower. Goals for Hemoglobin A1C reduction may need to be  modified if hypoglycemia is a problem.       Lab Results   Component Value Date    Glucose 84 12/20/2024    Glucose 83 11/02/2021    Glucose 73 (L) 02/25/2021    Glucose, UA Negative 12/18/2023     Lab Results   Component Value Date     (H) 12/20/2024     (H) 12/06/2023     (H) 11/02/2021     Lab Results   Component Value Date    CHOL 241 (H) 11/02/2021    CHOL 228 (H) 04/29/2019    CHOL 229 (H) 11/28/2016     Lab Results   Component Value Date    TRIG 83 12/20/2024    TRIG 91 12/06/2023    TRIG 47 11/02/2021     Lab Results   Component Value Date    HDL 74 12/20/2024    HDL 72 12/06/2023    HDL 83 (H) 11/02/2021     No results found for: \"PSA\"  Lab Results   Component Value Date    WBC 5.1 12/20/2024    HGB 13.2 12/20/2024    HCT 40.7 12/20/2024    MCV 89 12/20/2024     12/20/2024     Lab Results   Component Value Date    TSH 3.010 12/20/2024     Lab Results   Component Value Date    GLUCOSE 84 12/20/2024    BUN 12 12/20/2024    CREATININE 0.93 12/20/2024    EGFRIFNONA 69 11/02/2021    EGFRIFAFRI >60 02/25/2021    BCR 13 12/20/2024    K 4.9 12/20/2024    CO2 25 12/20/2024    CALCIUM 9.7 12/20/2024    PROTENTOTREF 6.4 12/20/2024    ALBUMIN 4.4 12/20/2024    LABIL2 1.8 12/06/2023    AST 15 " "12/20/2024    ALT 11 12/20/2024     No results found for: \"IVETH\", \"RF\", \"SEDRATE\"   No results found for: \"CRP\"   No results found for: \"IRON\", \"TIBC\", \"FERRITIN\"   Lab Results   Component Value Date    NHTIIUSR84 814 12/20/2024        Age-appropriate Screening Schedule:  Refer to the list below for future screening recommendations based on patient's age, sex and/or medical conditions. Orders for these recommended tests are listed in the plan section. The patient has been provided with a written plan.    Health Maintenance   Topic Date Due    Pneumococcal Vaccine 0-64 (1 of 2 - PCV) Never done    ZOSTER VACCINE (1 of 2) Never done    PAP SMEAR  Never done    MAMMOGRAM  04/09/2021    COLORECTAL CANCER SCREENING  06/24/2021    COVID-19 Vaccine (2 - 2024-25 season) 02/05/2025 (Originally 9/1/2024)    INFLUENZA VACCINE  03/31/2025 (Originally 7/1/2024)    HEPATITIS C SCREENING  02/03/2026 (Originally 12/12/2017)    LIPID PANEL  12/20/2025    ANNUAL PHYSICAL  02/03/2026    BMI FOLLOWUP  02/03/2026    TDAP/TD VACCINES (2 - Td or Tdap) 12/27/2034           Assessment & Plan      Medications        Problem List         LOS  Physical.  Doing well.  Vaccines current.  Discussed coated baby aspirin daily, discussed calcium and vitamin D.  Discussed health maintenance, screening test, lifestyle modification.  Followed by OB/GYN, recommend OB/GYN follow-up she states she will consider.  Recommend mammogram she declines currently.  COVID-19 viral upper respiratory infection.    Improved today/making steady progress.  Has had eval per [facility].  Persistent fatigue, intermittent shortness of breath, loss of smell.  Has had benign cardiology eval, recent heart cath negative for recurrence of AV shunt, lungs negative for PE/pneumonia per angiogram this summer, recommend pulmonology referral she declines currently secondary to cost.  Anxiety.   Overall stable on Wellbutrin.  Has done well on Zoloft in the past, recommend " restart or try Lexapro/Trintellix she states she will consider.  On Wellbutrin.  Consider counseling.  Good social support.  Follow-up recheck  Shift work disorder.  Improved on nuvigil, continue melatonin.  She recently changed to dayshift.  Vitamin D deficiency.  Replace, follow-up recheck.  Mixed hyperlipidemia.  Improved, ldl to 140.  Has been working on diet..  Discussed diet, exercise, lifestyle modification.  Restart regular exercise program.  Add red yeast rice.  Follow up recheck.  Recommend CT chest coronary artery screening calcium score/vascular bundle she states she will consider..    Lightheadedness/orthostasis.  Improved today.  h/o hypotension, bradycardia, followed by cardiology / [place], recommend heent eval she declines currently, has had vu in past.  Has had neurology eval per Dr. Hernandez, MRI brain normal 12/19.  AV fistula.  Clinically improved status post embolization [facility].  Plan Recheck echo 1 to 2 years.  Family history CVA.  Recommend carotid doppler UA she states she will consider.  Malaise and fatigue.  possibly 2nd hypotension, rec fludricortisone, midrodrine, she declines 2nd side effects (has had difficulty tolerating this in past, follwed by cardiology)recommend sleep study, + fh sumaya (father), she exercises twice daily  Bradycardia.  Pacemaker out currently/history of pacemaker infection.  Recurrent episode of syncope has cardiology follow-up scheduled/consider ambulatory monitoring.  Increase fluid intake.  Blood work per [facility] was reviewed/benign 2/21.  Restless leg syndrome.  Improved on Mirapex.  Cervical disc disease.  MRI 4/20 with ruptured disc C4/5, bulging disc multiple levels.  Recommend neurosurgery eval.  Tinnitus.  HEENT referral scheduled  Emphysematous changes in chest x-ray.  Check pulmonary function testing.  Follow-up recheck  Thyroid nodule.  1.7 cm nodule right thyroid per ultrasound 4/22, increased to 2 cm per us 11/23 tirads 3, repeat ultrasound  gorge.  Has had heent eval / obsrvation recommended.  Small 2 to 3 mm right per CT angiogram neck 2015.  Prior history of right hemithyroidectomy secondary to nodule.    Family history colon cancer.  Her father.  History of benign colonoscopy 2011.  Recommend repeat colonoscopy/discussed colonoscopy would be the best test, however she is not going to get colonoscopy could consider a Cologuard she declines both studies currently..      Previous Exams:    Mammogram was on 4/9/2019 at [facility]    There is no mammographic evidence of malignancy.     Screening Mammogram in 1 year is recommended.     BI-RADS Category 1: Negative   US Thyroid was on 12/4/2023 ordered by Dr Brock.     The solid right thyroid lesion has increased in size. It corresponds to a T RADS lesion 3, mildly suspicious. Based on size, follow-up ultrasound is recommended..     2 other right thyroid lesions have increased somewhat in size although are compatible with cysts.    Recommended repeat in 1 year  Colonoscopy was on 6/24/2011 by Dr Infante.     Impression of Normal colonoscopy to cecum    Recommended repeat in 5 years  EKG was on 9/8/2021  Echocardiogram was on 10/20/2021 ordered by Dr Mata.     Estimated left ventricular EF = 55% Left ventricular systolic function is normal.    Trace mitral valve regurgitation is present    Trace to mild tricuspid valve regurgitation is present.    Diagnoses and all orders for this visit:    1. Annual visit for general adult medical examination with abnormal findings (Primary)  -     Comprehensive Metabolic Panel; Future  -     CBC & Differential; Future  -     TSH; Future  -     Lipid Panel With / Chol / HDL Ratio; Future    2. Mixed hyperlipidemia  -     Comprehensive Metabolic Panel; Future  -     CBC & Differential; Future  -     TSH; Future  -     Lipid Panel With / Chol / HDL Ratio; Future    3. Acute reaction to stress  -     buPROPion XL (WELLBUTRIN XL) 300 MG 24 hr tablet; Take 1 tablet by  mouth Daily.  Dispense: 90 tablet; Refill: 3    4. Overweight with body mass index (BMI) of 29 to 29.9 in adult    5. Screening for malignant neoplasm of colon    6. Screening for malignant neoplasm of cervix    7. Encounter for screening mammogram for malignant neoplasm of breast    8. Thyroid nodule  -     US Thyroid; Future    9. Other migraine without status migrainosus, not intractable  -     gabapentin (NEURONTIN) 300 MG capsule; Take 1 capsule by mouth 3 (Three) Times a Day.  Dispense: 270 capsule; Refill: 0    10. Restless legs syndrome (RLS)  -     gabapentin (NEURONTIN) 300 MG capsule; Take 1 capsule by mouth 3 (Three) Times a Day.  Dispense: 270 capsule; Refill: 0  -     meloxicam (MOBIC) 15 MG tablet; Take 1 tablet by mouth Daily. As needed  Dispense: 90 tablet; Refill: 3  -     pramipexole (MIRAPEX) 0.125 MG tablet; TAKE 1 TO 2 TABLETS BY MOUTH NIGHTLY AS NEEDED  Dispense: 180 tablet; Refill: 3        BMI is >= 25 and <30. (Overweight) The following options were offered after discussion;: weight loss educational material (shared in after visit summary)        Expected course, medications, and adverse effects discussed.  Call or return if worsening or persistent symptoms.  I wore protective equipment throughout this patient encounter including a mask, gloves, and eye protection.  Hand hygiene was performed before donning protective equipment and after removal when leaving the room. The complete contents of the Assessment and Plan and Data / Lab Results as documented above have been reviewed and addressed by myself with the patient today as part of an ongoing evaluation / treatment plan.  If some of the documentation has been copied from a previous note and is unchanged it indicates that this problem / plan has been assessed today but is stable from a previous visit and no changes have been recommended.  The separate E/M service provided today is significant, medically necessary, and separately  identifiable.

## 2025-02-03 ENCOUNTER — OFFICE VISIT (OUTPATIENT)
Dept: FAMILY MEDICINE CLINIC | Facility: CLINIC | Age: 60
End: 2025-02-03
Payer: COMMERCIAL

## 2025-02-03 VITALS
RESPIRATION RATE: 18 BRPM | HEART RATE: 92 BPM | TEMPERATURE: 98 F | WEIGHT: 179 LBS | HEIGHT: 69 IN | SYSTOLIC BLOOD PRESSURE: 118 MMHG | DIASTOLIC BLOOD PRESSURE: 82 MMHG | BODY MASS INDEX: 26.51 KG/M2 | OXYGEN SATURATION: 93 %

## 2025-02-03 DIAGNOSIS — Z12.11 SCREENING FOR MALIGNANT NEOPLASM OF COLON: ICD-10-CM

## 2025-02-03 DIAGNOSIS — Z12.31 ENCOUNTER FOR SCREENING MAMMOGRAM FOR MALIGNANT NEOPLASM OF BREAST: ICD-10-CM

## 2025-02-03 DIAGNOSIS — E66.3 OVERWEIGHT WITH BODY MASS INDEX (BMI) OF 29 TO 29.9 IN ADULT: ICD-10-CM

## 2025-02-03 DIAGNOSIS — E78.2 MIXED HYPERLIPIDEMIA: ICD-10-CM

## 2025-02-03 DIAGNOSIS — Z00.01 ANNUAL VISIT FOR GENERAL ADULT MEDICAL EXAMINATION WITH ABNORMAL FINDINGS: Primary | ICD-10-CM

## 2025-02-03 DIAGNOSIS — E04.1 THYROID NODULE: ICD-10-CM

## 2025-02-03 DIAGNOSIS — F43.0 ACUTE REACTION TO STRESS: ICD-10-CM

## 2025-02-03 DIAGNOSIS — G43.809 OTHER MIGRAINE WITHOUT STATUS MIGRAINOSUS, NOT INTRACTABLE: ICD-10-CM

## 2025-02-03 DIAGNOSIS — G25.81 RESTLESS LEGS SYNDROME (RLS): ICD-10-CM

## 2025-02-03 DIAGNOSIS — Z12.4 SCREENING FOR MALIGNANT NEOPLASM OF CERVIX: ICD-10-CM

## 2025-02-03 PROCEDURE — 99213 OFFICE O/P EST LOW 20 MIN: CPT | Performed by: FAMILY MEDICINE

## 2025-02-03 PROCEDURE — 99396 PREV VISIT EST AGE 40-64: CPT | Performed by: FAMILY MEDICINE

## 2025-02-03 RX ORDER — MELOXICAM 15 MG/1
15 TABLET ORAL DAILY
Qty: 90 TABLET | Refills: 3 | Status: SHIPPED | OUTPATIENT
Start: 2025-02-03

## 2025-02-03 RX ORDER — BUPROPION HYDROCHLORIDE 300 MG/1
300 TABLET ORAL DAILY
Qty: 90 TABLET | Refills: 3 | Status: SHIPPED | OUTPATIENT
Start: 2025-02-03

## 2025-02-03 RX ORDER — GABAPENTIN 300 MG/1
300 CAPSULE ORAL 3 TIMES DAILY
Qty: 270 CAPSULE | Refills: 0 | Status: SHIPPED | OUTPATIENT
Start: 2025-02-03

## 2025-02-03 RX ORDER — PRAMIPEXOLE DIHYDROCHLORIDE 0.12 MG/1
TABLET ORAL
Qty: 180 TABLET | Refills: 3 | Status: SHIPPED | OUTPATIENT
Start: 2025-02-03

## 2025-02-14 ENCOUNTER — HOSPITAL ENCOUNTER (OUTPATIENT)
Dept: ULTRASOUND IMAGING | Facility: HOSPITAL | Age: 60
Discharge: HOME | End: 2025-02-14
Admitting: FAMILY MEDICINE
Payer: COMMERCIAL

## 2025-02-14 DIAGNOSIS — E04.1 THYROID NODULE: ICD-10-CM

## 2025-02-14 PROCEDURE — 76536 US EXAM OF HEAD AND NECK: CPT

## 2025-02-27 DIAGNOSIS — E66.3 OVERWEIGHT WITH BODY MASS INDEX (BMI) OF 29 TO 29.9 IN ADULT: ICD-10-CM

## 2025-02-28 RX ORDER — SEMAGLUTIDE 1.34 MG/ML
0.5 INJECTION, SOLUTION SUBCUTANEOUS WEEKLY
Qty: 2 ML | Refills: 1 | Status: SHIPPED | OUTPATIENT
Start: 2025-02-28

## 2025-08-04 ENCOUNTER — OFFICE VISIT (OUTPATIENT)
Dept: FAMILY MEDICINE CLINIC | Facility: CLINIC | Age: 60
End: 2025-08-04
Payer: COMMERCIAL

## 2025-08-04 VITALS
DIASTOLIC BLOOD PRESSURE: 86 MMHG | BODY MASS INDEX: 26.39 KG/M2 | TEMPERATURE: 98 F | HEART RATE: 67 BPM | OXYGEN SATURATION: 97 % | WEIGHT: 178.2 LBS | HEIGHT: 69 IN | RESPIRATION RATE: 20 BRPM | SYSTOLIC BLOOD PRESSURE: 124 MMHG

## 2025-08-04 DIAGNOSIS — F41.9 ANXIETY: Primary | ICD-10-CM

## 2025-08-04 DIAGNOSIS — E66.3 OVERWEIGHT WITH BODY MASS INDEX (BMI) 25.0-29.9: ICD-10-CM

## 2025-08-04 DIAGNOSIS — E04.1 THYROID NODULE: ICD-10-CM

## 2025-08-04 DIAGNOSIS — J30.89 SEASONAL ALLERGIC RHINITIS DUE TO OTHER ALLERGIC TRIGGER: ICD-10-CM

## 2025-08-04 DIAGNOSIS — G47.26 SHIFT WORK SLEEP DISORDER: ICD-10-CM

## 2025-08-04 RX ORDER — CETIRIZINE HYDROCHLORIDE 10 MG/1
10 TABLET ORAL NIGHTLY
Qty: 90 TABLET | Refills: 3 | Status: SHIPPED | OUTPATIENT
Start: 2025-08-04

## (undated) DEVICE — CO-SET DELIVERY SYSTEM FOR 123 ROOM TEMPATURE INJECTATE: Brand: CO-SET+

## (undated) DEVICE — SWAN-GANZ THERMODILUTION CATHETER: Brand: SWAN-GANZ

## (undated) DEVICE — PINNACLE INTRODUCER SHEATH: Brand: PINNACLE

## (undated) DEVICE — PK CATH CARD 10

## (undated) DEVICE — NDL PERC 1PRT THNWALL W/BASEPLT 18G 7CM

## (undated) DEVICE — CATH TEMPO 5F BER 100CM: Brand: TEMPO

## (undated) DEVICE — KT MANIFOLD CATHLAB CUST